# Patient Record
Sex: FEMALE | Race: BLACK OR AFRICAN AMERICAN | NOT HISPANIC OR LATINO | Employment: UNEMPLOYED | URBAN - METROPOLITAN AREA
[De-identification: names, ages, dates, MRNs, and addresses within clinical notes are randomized per-mention and may not be internally consistent; named-entity substitution may affect disease eponyms.]

---

## 2017-08-04 ENCOUNTER — HOSPITAL ENCOUNTER (INPATIENT)
Facility: HOSPITAL | Age: 42
LOS: 2 days | Discharge: LEFT BEFORE DISCHARGE | DRG: 189 | End: 2017-08-07
Attending: EMERGENCY MEDICINE | Admitting: HOSPITALIST
Payer: MEDICAID

## 2017-08-04 DIAGNOSIS — J81.1 PULMONARY EDEMA: ICD-10-CM

## 2017-08-04 DIAGNOSIS — R06.02 SHORTNESS OF BREATH: ICD-10-CM

## 2017-08-04 DIAGNOSIS — D72.829 LEUKOCYTOSIS, UNSPECIFIED TYPE: ICD-10-CM

## 2017-08-04 DIAGNOSIS — J81.0 ACUTE PULMONARY EDEMA: Primary | ICD-10-CM

## 2017-08-04 DIAGNOSIS — J96.01 ACUTE RESPIRATORY FAILURE WITH HYPOXIA: ICD-10-CM

## 2017-08-04 LAB
ALBUMIN SERPL BCP-MCNC: 3.2 G/DL
ALP SERPL-CCNC: 143 U/L
ALT SERPL W/O P-5'-P-CCNC: 6 U/L
ANION GAP SERPL CALC-SCNC: 15 MMOL/L
AST SERPL-CCNC: 45 U/L
B-HCG UR QL: NEGATIVE
BASOPHILS # BLD AUTO: 0.02 K/UL
BASOPHILS NFR BLD: 0.1 %
BILIRUB SERPL-MCNC: 0.5 MG/DL
BNP SERPL-MCNC: 383 PG/ML
BUN SERPL-MCNC: 10 MG/DL
CALCIUM SERPL-MCNC: 8.7 MG/DL
CHLORIDE SERPL-SCNC: 98 MMOL/L
CO2 SERPL-SCNC: 24 MMOL/L
CREAT SERPL-MCNC: 1.1 MG/DL
CTP QC/QA: YES
DIFFERENTIAL METHOD: ABNORMAL
EOSINOPHIL # BLD AUTO: 0 K/UL
EOSINOPHIL NFR BLD: 0.1 %
ERYTHROCYTE [DISTWIDTH] IN BLOOD BY AUTOMATED COUNT: 15.1 %
EST. GFR  (AFRICAN AMERICAN): >60 ML/MIN/1.73 M^2
EST. GFR  (NON AFRICAN AMERICAN): >60 ML/MIN/1.73 M^2
GLUCOSE SERPL-MCNC: 124 MG/DL
HCT VFR BLD AUTO: 41.6 %
HGB BLD-MCNC: 13.4 G/DL
LYMPHOCYTES # BLD AUTO: 0.7 K/UL
LYMPHOCYTES NFR BLD: 3.7 %
MCH RBC QN AUTO: 28 PG
MCHC RBC AUTO-ENTMCNC: 32.2 G/DL
MCV RBC AUTO: 87 FL
MONOCYTES # BLD AUTO: 0.4 K/UL
MONOCYTES NFR BLD: 2.4 %
NEUTROPHILS # BLD AUTO: 17.4 K/UL
NEUTROPHILS NFR BLD: 93.7 %
PLATELET # BLD AUTO: 214 K/UL
PMV BLD AUTO: 10.5 FL
POTASSIUM SERPL-SCNC: 3.3 MMOL/L
PROT SERPL-MCNC: 7.4 G/DL
RBC # BLD AUTO: 4.78 M/UL
SODIUM SERPL-SCNC: 137 MMOL/L
TROPONIN I SERPL DL<=0.01 NG/ML-MCNC: 0.05 NG/ML
TSH SERPL DL<=0.005 MIU/L-ACNC: 1.22 UIU/ML
WBC # BLD AUTO: 18.59 K/UL

## 2017-08-04 PROCEDURE — 63600175 PHARM REV CODE 636 W HCPCS: Performed by: EMERGENCY MEDICINE

## 2017-08-04 PROCEDURE — 96366 THER/PROPH/DIAG IV INF ADDON: CPT

## 2017-08-04 PROCEDURE — 99291 CRITICAL CARE FIRST HOUR: CPT | Mod: 25

## 2017-08-04 PROCEDURE — 27000190 HC CPAP FULL FACE MASK W/VALVE

## 2017-08-04 PROCEDURE — 84484 ASSAY OF TROPONIN QUANT: CPT

## 2017-08-04 PROCEDURE — 80053 COMPREHEN METABOLIC PANEL: CPT

## 2017-08-04 PROCEDURE — 20000000 HC ICU ROOM

## 2017-08-04 PROCEDURE — 96375 TX/PRO/DX INJ NEW DRUG ADDON: CPT

## 2017-08-04 PROCEDURE — 81025 URINE PREGNANCY TEST: CPT | Performed by: EMERGENCY MEDICINE

## 2017-08-04 PROCEDURE — 83880 ASSAY OF NATRIURETIC PEPTIDE: CPT

## 2017-08-04 PROCEDURE — 99900035 HC TECH TIME PER 15 MIN (STAT)

## 2017-08-04 PROCEDURE — 96365 THER/PROPH/DIAG IV INF INIT: CPT

## 2017-08-04 PROCEDURE — 86703 HIV-1/HIV-2 1 RESULT ANTBDY: CPT

## 2017-08-04 PROCEDURE — 94660 CPAP INITIATION&MGMT: CPT

## 2017-08-04 PROCEDURE — 84443 ASSAY THYROID STIM HORMONE: CPT

## 2017-08-04 PROCEDURE — 25500020 PHARM REV CODE 255: Performed by: EMERGENCY MEDICINE

## 2017-08-04 PROCEDURE — 25000003 PHARM REV CODE 250: Performed by: EMERGENCY MEDICINE

## 2017-08-04 PROCEDURE — 93005 ELECTROCARDIOGRAM TRACING: CPT

## 2017-08-04 PROCEDURE — 96368 THER/DIAG CONCURRENT INF: CPT

## 2017-08-04 PROCEDURE — 85025 COMPLETE CBC W/AUTO DIFF WBC: CPT

## 2017-08-04 RX ORDER — ALBUTEROL SULFATE 0.63 MG/3ML
0.63 SOLUTION RESPIRATORY (INHALATION) EVERY 6 HOURS PRN
COMMUNITY
End: 2019-10-11 | Stop reason: SDUPTHER

## 2017-08-04 RX ORDER — FUROSEMIDE 10 MG/ML
20 INJECTION INTRAMUSCULAR; INTRAVENOUS
Status: COMPLETED | OUTPATIENT
Start: 2017-08-04 | End: 2017-08-04

## 2017-08-04 RX ORDER — OXYCODONE HYDROCHLORIDE 15 MG/1
10 TABLET ORAL EVERY 4 HOURS PRN
COMMUNITY

## 2017-08-04 RX ADMIN — AZITHROMYCIN MONOHYDRATE 500 MG: 500 INJECTION, POWDER, LYOPHILIZED, FOR SOLUTION INTRAVENOUS at 11:08

## 2017-08-04 RX ADMIN — IOHEXOL 70 ML: 350 INJECTION, SOLUTION INTRAVENOUS at 10:08

## 2017-08-04 RX ADMIN — CEFTRIAXONE 1 G: 1 INJECTION, SOLUTION INTRAVENOUS at 10:08

## 2017-08-04 RX ADMIN — FUROSEMIDE 20 MG: 10 INJECTION, SOLUTION INTRAMUSCULAR; INTRAVENOUS at 10:08

## 2017-08-05 PROBLEM — E87.6 HYPOKALEMIA: Status: ACTIVE | Noted: 2017-08-05

## 2017-08-05 PROBLEM — R79.89 ELEVATED TROPONIN: Status: ACTIVE | Noted: 2017-08-05

## 2017-08-05 PROBLEM — J96.01 ACUTE HYPOXEMIC RESPIRATORY FAILURE: Status: ACTIVE | Noted: 2017-08-05

## 2017-08-05 PROBLEM — J81.0 ACUTE PULMONARY EDEMA: Status: ACTIVE | Noted: 2017-08-05

## 2017-08-05 PROBLEM — D72.9 NEUTROPHILIC LEUKOCYTOSIS: Status: ACTIVE | Noted: 2017-08-05

## 2017-08-05 PROBLEM — D72.828 NEUTROPHILIC LEUKOCYTOSIS: Status: ACTIVE | Noted: 2017-08-05

## 2017-08-05 LAB
ALLENS TEST: ABNORMAL
ANION GAP SERPL CALC-SCNC: 11 MMOL/L
AORTIC VALVE REGURGITATION: NORMAL
BASOPHILS # BLD AUTO: 0.01 K/UL
BASOPHILS NFR BLD: 0.1 %
BUN SERPL-MCNC: 10 MG/DL
CALCIUM SERPL-MCNC: 9.3 MG/DL
CHLORIDE SERPL-SCNC: 100 MMOL/L
CO2 SERPL-SCNC: 24 MMOL/L
CREAT SERPL-MCNC: 1.1 MG/DL
CRP SERPL-MCNC: 297.58 MG/L
DELSYS: ABNORMAL
DIFFERENTIAL METHOD: ABNORMAL
EOSINOPHIL # BLD AUTO: 0 K/UL
EOSINOPHIL NFR BLD: 0 %
EP: 5
ERYTHROCYTE [DISTWIDTH] IN BLOOD BY AUTOMATED COUNT: 15.1 %
ERYTHROCYTE [SEDIMENTATION RATE] IN BLOOD BY WESTERGREN METHOD: 12 MM/H
ERYTHROCYTE [SEDIMENTATION RATE] IN BLOOD BY WESTERGREN METHOD: 41 MM/HR
EST. GFR  (AFRICAN AMERICAN): >60 ML/MIN/1.73 M^2
EST. GFR  (NON AFRICAN AMERICAN): >60 ML/MIN/1.73 M^2
ESTIMATED PA SYSTOLIC PRESSURE: 34.83
FIO2: 70
GLOBAL PERICARDIAL EFFUSION: NORMAL
GLUCOSE SERPL-MCNC: 128 MG/DL
HCO3 UR-SCNC: 28.2 MMOL/L (ref 24–28)
HCT VFR BLD AUTO: 42 %
HGB BLD-MCNC: 13.7 G/DL
HIV1+2 IGG SERPL QL IA.RAPID: NEGATIVE
IP: 10
LYMPHOCYTES # BLD AUTO: 0.8 K/UL
LYMPHOCYTES NFR BLD: 5 %
MCH RBC QN AUTO: 28.4 PG
MCHC RBC AUTO-ENTMCNC: 32.6 G/DL
MCV RBC AUTO: 87 FL
MIN VOL: 14.1
MITRAL VALVE MOBILITY: NORMAL
MODE: ABNORMAL
MONOCYTES # BLD AUTO: 0.3 K/UL
MONOCYTES NFR BLD: 1.9 %
NEUTROPHILS # BLD AUTO: 14.4 K/UL
NEUTROPHILS NFR BLD: 93 %
PCO2 BLDA: 42.3 MMHG (ref 35–45)
PH SMN: 7.43 [PH] (ref 7.35–7.45)
PLATELET # BLD AUTO: 220 K/UL
PMV BLD AUTO: 10.7 FL
PO2 BLDA: 117 MMHG (ref 80–100)
POC BE: 3 MMOL/L
POC SATURATED O2: 99 % (ref 95–100)
POC TCO2: 29 MMOL/L (ref 23–27)
POTASSIUM SERPL-SCNC: 4.6 MMOL/L
RBC # BLD AUTO: 4.83 M/UL
RETIRED EF AND QEF - SEE NOTES: 55 (ref 55–65)
SAMPLE: ABNORMAL
SITE: ABNORMAL
SODIUM SERPL-SCNC: 135 MMOL/L
SP02: 100
SPONT RATE: 29
WBC # BLD AUTO: 15.48 K/UL

## 2017-08-05 PROCEDURE — 63600175 PHARM REV CODE 636 W HCPCS: Performed by: INTERNAL MEDICINE

## 2017-08-05 PROCEDURE — 80048 BASIC METABOLIC PNL TOTAL CA: CPT

## 2017-08-05 PROCEDURE — 86141 C-REACTIVE PROTEIN HS: CPT

## 2017-08-05 PROCEDURE — 94640 AIRWAY INHALATION TREATMENT: CPT

## 2017-08-05 PROCEDURE — 99900035 HC TECH TIME PER 15 MIN (STAT)

## 2017-08-05 PROCEDURE — 80307 DRUG TEST PRSMV CHEM ANLYZR: CPT

## 2017-08-05 PROCEDURE — 20000000 HC ICU ROOM

## 2017-08-05 PROCEDURE — 36415 COLL VENOUS BLD VENIPUNCTURE: CPT

## 2017-08-05 PROCEDURE — 63600175 PHARM REV CODE 636 W HCPCS: Performed by: EMERGENCY MEDICINE

## 2017-08-05 PROCEDURE — 25000003 PHARM REV CODE 250: Performed by: HOSPITALIST

## 2017-08-05 PROCEDURE — 25000242 PHARM REV CODE 250 ALT 637 W/ HCPCS: Performed by: HOSPITALIST

## 2017-08-05 PROCEDURE — 85025 COMPLETE CBC W/AUTO DIFF WBC: CPT

## 2017-08-05 PROCEDURE — 25000003 PHARM REV CODE 250: Performed by: EMERGENCY MEDICINE

## 2017-08-05 PROCEDURE — 94660 CPAP INITIATION&MGMT: CPT

## 2017-08-05 PROCEDURE — 85651 RBC SED RATE NONAUTOMATED: CPT

## 2017-08-05 PROCEDURE — 25000003 PHARM REV CODE 250: Performed by: INTERNAL MEDICINE

## 2017-08-05 PROCEDURE — 86738 MYCOPLASMA ANTIBODY: CPT | Mod: 91

## 2017-08-05 PROCEDURE — A4216 STERILE WATER/SALINE, 10 ML: HCPCS | Performed by: EMERGENCY MEDICINE

## 2017-08-05 PROCEDURE — 86713 LEGIONELLA ANTIBODY: CPT

## 2017-08-05 PROCEDURE — 27000221 HC OXYGEN, UP TO 24 HOURS

## 2017-08-05 PROCEDURE — 36600 WITHDRAWAL OF ARTERIAL BLOOD: CPT

## 2017-08-05 PROCEDURE — 93306 TTE W/DOPPLER COMPLETE: CPT

## 2017-08-05 PROCEDURE — 82803 BLOOD GASES ANY COMBINATION: CPT

## 2017-08-05 PROCEDURE — 93306 TTE W/DOPPLER COMPLETE: CPT | Mod: 26,,, | Performed by: INTERNAL MEDICINE

## 2017-08-05 PROCEDURE — 87449 NOS EACH ORGANISM AG IA: CPT

## 2017-08-05 RX ORDER — POTASSIUM CHLORIDE 20 MEQ/1
40 TABLET, EXTENDED RELEASE ORAL ONCE
Status: DISCONTINUED | OUTPATIENT
Start: 2017-08-05 | End: 2017-08-07 | Stop reason: HOSPADM

## 2017-08-05 RX ORDER — ACETAMINOPHEN 325 MG/1
650 TABLET ORAL EVERY 8 HOURS PRN
Status: DISCONTINUED | OUTPATIENT
Start: 2017-08-05 | End: 2017-08-07 | Stop reason: HOSPADM

## 2017-08-05 RX ORDER — FUROSEMIDE 10 MG/ML
20 INJECTION INTRAMUSCULAR; INTRAVENOUS 2 TIMES DAILY
Status: DISCONTINUED | OUTPATIENT
Start: 2017-08-05 | End: 2017-08-05

## 2017-08-05 RX ORDER — GUAIFENESIN/DEXTROMETHORPHAN 100-10MG/5
10 SYRUP ORAL EVERY 4 HOURS PRN
Status: DISCONTINUED | OUTPATIENT
Start: 2017-08-05 | End: 2017-08-07 | Stop reason: HOSPADM

## 2017-08-05 RX ORDER — GABAPENTIN 100 MG/1
100 CAPSULE ORAL 3 TIMES DAILY
COMMUNITY
End: 2022-08-01

## 2017-08-05 RX ORDER — ALPRAZOLAM 0.25 MG/1
0.25 TABLET ORAL 3 TIMES DAILY
COMMUNITY
End: 2019-01-30

## 2017-08-05 RX ORDER — FUROSEMIDE 10 MG/ML
40 INJECTION INTRAMUSCULAR; INTRAVENOUS ONCE
Status: COMPLETED | OUTPATIENT
Start: 2017-08-05 | End: 2017-08-05

## 2017-08-05 RX ORDER — OXYCODONE HYDROCHLORIDE 5 MG/1
5 TABLET ORAL EVERY 6 HOURS PRN
Status: DISCONTINUED | OUTPATIENT
Start: 2017-08-05 | End: 2017-08-07 | Stop reason: HOSPADM

## 2017-08-05 RX ORDER — ONDANSETRON 2 MG/ML
8 INJECTION INTRAMUSCULAR; INTRAVENOUS EVERY 8 HOURS PRN
Status: DISCONTINUED | OUTPATIENT
Start: 2017-08-05 | End: 2017-08-07 | Stop reason: HOSPADM

## 2017-08-05 RX ORDER — LORAZEPAM 2 MG/ML
0.5 INJECTION INTRAMUSCULAR
Status: DISCONTINUED | OUTPATIENT
Start: 2017-08-05 | End: 2017-08-05

## 2017-08-05 RX ORDER — LEVOTHYROXINE SODIUM 150 UG/1
150 TABLET ORAL DAILY
Status: DISCONTINUED | OUTPATIENT
Start: 2017-08-05 | End: 2017-08-07 | Stop reason: HOSPADM

## 2017-08-05 RX ORDER — SODIUM CHLORIDE 0.9 % (FLUSH) 0.9 %
3 SYRINGE (ML) INJECTION EVERY 8 HOURS
Status: DISCONTINUED | OUTPATIENT
Start: 2017-08-05 | End: 2017-08-07 | Stop reason: HOSPADM

## 2017-08-05 RX ORDER — IPRATROPIUM BROMIDE AND ALBUTEROL SULFATE 2.5; .5 MG/3ML; MG/3ML
3 SOLUTION RESPIRATORY (INHALATION) EVERY 6 HOURS
Status: DISCONTINUED | OUTPATIENT
Start: 2017-08-05 | End: 2017-08-07 | Stop reason: HOSPADM

## 2017-08-05 RX ORDER — ONDANSETRON 2 MG/ML
4 INJECTION INTRAMUSCULAR; INTRAVENOUS EVERY 12 HOURS PRN
Status: DISCONTINUED | OUTPATIENT
Start: 2017-08-05 | End: 2017-08-05

## 2017-08-05 RX ORDER — FUROSEMIDE 10 MG/ML
60 INJECTION INTRAMUSCULAR; INTRAVENOUS 2 TIMES DAILY
Status: DISCONTINUED | OUTPATIENT
Start: 2017-08-05 | End: 2017-08-07 | Stop reason: HOSPADM

## 2017-08-05 RX ORDER — ENOXAPARIN SODIUM 100 MG/ML
40 INJECTION SUBCUTANEOUS EVERY 24 HOURS
Status: DISCONTINUED | OUTPATIENT
Start: 2017-08-05 | End: 2017-08-07 | Stop reason: HOSPADM

## 2017-08-05 RX ADMIN — FUROSEMIDE 20 MG: 10 INJECTION, SOLUTION INTRAMUSCULAR; INTRAVENOUS at 09:08

## 2017-08-05 RX ADMIN — OXYCODONE HYDROCHLORIDE 5 MG: 5 TABLET ORAL at 02:08

## 2017-08-05 RX ADMIN — GUAIFENESIN AND DEXTROMETHORPHAN 10 ML: 100; 10 SYRUP ORAL at 02:08

## 2017-08-05 RX ADMIN — OXYCODONE HYDROCHLORIDE 5 MG: 5 TABLET ORAL at 08:08

## 2017-08-05 RX ADMIN — IPRATROPIUM BROMIDE AND ALBUTEROL SULFATE 3 ML: .5; 3 SOLUTION RESPIRATORY (INHALATION) at 07:08

## 2017-08-05 RX ADMIN — FUROSEMIDE 60 MG: 10 INJECTION, SOLUTION INTRAMUSCULAR; INTRAVENOUS at 05:08

## 2017-08-05 RX ADMIN — IPRATROPIUM BROMIDE AND ALBUTEROL SULFATE 3 ML: .5; 3 SOLUTION RESPIRATORY (INHALATION) at 12:08

## 2017-08-05 RX ADMIN — Medication 3 ML: at 10:08

## 2017-08-05 RX ADMIN — ENOXAPARIN SODIUM 40 MG: 100 INJECTION SUBCUTANEOUS at 05:08

## 2017-08-05 RX ADMIN — AZITHROMYCIN MONOHYDRATE 500 MG: 500 INJECTION, POWDER, LYOPHILIZED, FOR SOLUTION INTRAVENOUS at 10:08

## 2017-08-05 RX ADMIN — GUAIFENESIN AND DEXTROMETHORPHAN 10 ML: 100; 10 SYRUP ORAL at 10:08

## 2017-08-05 RX ADMIN — CEFTRIAXONE 1 G: 1 INJECTION, SOLUTION INTRAVENOUS at 10:08

## 2017-08-05 RX ADMIN — FUROSEMIDE 40 MG: 10 INJECTION, SOLUTION INTRAMUSCULAR; INTRAVENOUS at 12:08

## 2017-08-05 RX ADMIN — LEVOTHYROXINE SODIUM 150 MCG: 150 TABLET ORAL at 06:08

## 2017-08-05 RX ADMIN — ACETAMINOPHEN 650 MG: 325 TABLET, FILM COATED ORAL at 10:08

## 2017-08-05 RX ADMIN — Medication 3 ML: at 06:08

## 2017-08-05 RX ADMIN — GUAIFENESIN AND DEXTROMETHORPHAN 10 ML: 100; 10 SYRUP ORAL at 06:08

## 2017-08-05 NOTE — ED PROVIDER NOTES
"Encounter Date: 8/4/2017    SCRIBE #1 NOTE: I, Piedad Regalado, am scribing for, and in the presence of, Pelon Zuleta III, MD. Other sections scribed: HPI, ROS.       History     Chief Complaint   Patient presents with    Shortness of Breath     Pt reports shortness of breath that began earlier today. Pt has been treated for bronchitis in the past. Per EMS, 60 % sats on RA prior to arrival. Pt presents with 93% on breathing treatment     CC: Shortness of Breath    HPI: This 42 y.o. female  with a past medical history of Depression; Discoid lupus; and Hypothyroid presents to the ED via EMS c/o acute onset of SOB with associated dry cough, sore throat, and subjective fevers beginning this AM . Pt reports the SOB and associated sx "came out of nowhere" and has not gone away. Pt's pain is severe (8/10). Pt tried an albuterol treatment with no resolve of sx. Per EMS, 60% O2 saturation prior to arrival to ED .                Review of patient's allergies indicates:  No Known Allergies  Past Medical History:   Diagnosis Date    Depression     Discoid lupus     Hypothyroid      Past Surgical History:   Procedure Laterality Date    THYROIDECTOMY      TUBAL LIGATION       No family history on file.  Social History   Substance Use Topics    Smoking status: Current Every Day Smoker     Packs/day: 0.50     Years: 0.50     Types: Cigarettes    Smokeless tobacco: Not on file    Alcohol use No     Review of Systems   Constitutional: Positive for fever (subjective).   HENT: Positive for sore throat.    Respiratory: Positive for cough (dry) and shortness of breath.    Cardiovascular: Negative for chest pain.   Gastrointestinal: Positive for nausea.   Genitourinary: Negative for dysuria.   Musculoskeletal: Negative for back pain.   Skin: Negative for rash.   Neurological: Negative for weakness.   Hematological: Does not bruise/bleed easily.       Physical Exam     Initial Vitals [08/04/17 1943]   BP Pulse Resp Temp SpO2   (!) " 146/72 (!) 113 (!) 22 98.2 °F (36.8 °C) (!) 93 %      MAP       96.67         Physical Exam    Nursing note and vitals reviewed.  Constitutional: She appears well-developed and well-nourished. She is not diaphoretic. No distress.   HENT:   Head: Normocephalic and atraumatic.   Nose: Nose normal.   Mouth/Throat: Oropharynx is clear and moist. No oropharyngeal exudate.   Eyes: Conjunctivae and EOM are normal. Pupils are equal, round, and reactive to light. No scleral icterus.   Neck: Normal range of motion. Neck supple. No thyromegaly present. No tracheal deviation present.   Cardiovascular: Regular rhythm and normal heart sounds. Exam reveals no gallop and no friction rub.    No murmur heard.  Mildly tachycardic   Pulmonary/Chest: No respiratory distress. She has no wheezes. She has no rhonchi. She has rales.   Mildly increased work of breathing   Abdominal: Soft. Bowel sounds are normal. She exhibits no distension and no mass. There is no tenderness. There is no rebound and no guarding.   Musculoskeletal: Normal range of motion. She exhibits no edema or tenderness.   Lymphadenopathy:     She has no cervical adenopathy.   Neurological: She is alert and oriented to person, place, and time. She has normal strength. No cranial nerve deficit or sensory deficit.   Mildly somnolent, easily arousable   Skin: Skin is warm and dry. No rash noted. No erythema. No pallor.   Psychiatric: She has a normal mood and affect. Her behavior is normal. Thought content normal.         ED Course   Critical Care  Date/Time: 8/5/2017 1:13 AM  Performed by: FOSTER ADRIAN III  Authorized by: FOSTER ADRIAN III   Direct patient critical care time: 30 minutes  Additional history critical care time: 2 minutes  Ordering / reviewing critical care time: 8 minutes  Documentation critical care time: 10 minutes  Consulting other physicians critical care time: 5 minutes  Total critical care time (exclusive of procedural time) : 55 minutes  Critical  care time was exclusive of teaching time and separately billable procedures and treating other patients.  Critical care was necessary to treat or prevent imminent or life-threatening deterioration of the following conditions: respiratory failure and cardiac failure.        Labs Reviewed   CBC W/ AUTO DIFFERENTIAL - Abnormal; Notable for the following:        Result Value    WBC 18.59 (*)     RDW 15.1 (*)     Gran # 17.4 (*)     Lymph # 0.7 (*)     Gran% 93.7 (*)     Lymph% 3.7 (*)     Mono% 2.4 (*)     All other components within normal limits   B-TYPE NATRIURETIC PEPTIDE - Abnormal; Notable for the following:      (*)     All other components within normal limits   COMPREHENSIVE METABOLIC PANEL - Abnormal; Notable for the following:     Potassium 3.3 (*)     Glucose 124 (*)     Albumin 3.2 (*)     Alkaline Phosphatase 143 (*)     AST 45 (*)     ALT 6 (*)     All other components within normal limits   TROPONIN I - Abnormal; Notable for the following:     Troponin I 0.048 (*)     All other components within normal limits   TSH   RAPID HIV   POCT URINE PREGNANCY             Medical Decision Making:   Initial Assessment:   42-year-old female with a history of discoid lupus, not taking any SLE meds, and no other significant history presents complaining of shortness of breath and cough that began today.  EMS reports patient's oxygen saturation was 60% on room air when they arrived at her home.  Patient reported she had tried nebulized albuterol at home with no effect.  On exam patient is lying back in bed with head of the bed elevated to 45°, with only mildly increased work of breathing and no respiratory distress.  She does have central rales, but no other evidence of volume overload.  Her blood pressure is normal.  Her oxygen saturation is somewhat difficult to obtain, jumping rapidly from the low 80s to the low 90s, even with a good waveform.   Differential Diagnosis:   Pneumonia, CHF, noncardiogenic pulmonary  edema, pulmonary embolism  Independently Interpreted Test(s):   I have ordered and independently interpreted X-rays - see summary below.       <> Summary of X-Ray Reading(s): Chest x-ray: Suggestive of pulmonary edema, no cardiomegaly  I have ordered and independently interpreted EKG Reading(s) - see summary below       <> Summary of EKG Reading(s): Sinus rhythm at 100 bpm, normal axis, prolonged QT with otherwise normal intervals, left atrial enlargement and LVH  ED Management:  Patient's workup reveals an equivocal BNP, leukocytosis with no bandemia, no fever, no renal abnormality, chest x-ray suggestive of pulmonary edema.  Will treat empirically with ceftriaxone and azithromycin along with IV Lasix.    Underlying etiology of pulmonary edema not immediately clear.  Presumptive diagnosis is CHF, but patient has no peripheral edema, her blood pressure is upper limits of normal, her BNP is equivocal, cardiomediastinal silhouette normal on x-ray.  Will obtain CT of the chest to rule out pulmonary embolism.     CT chest does not show PE.  Diffuse groundglass opacity most suggestive of pulmonary edema versus interstitial pneumonia.     Oxygen saturation has remained somewhat difficult to reliably obtain in the emergency department, still rapidly changing despite a good waveform, but the patient has been noted to have sats reading more consistently in the 80s.  As result ABG was ordered, which shows a PO2 in the 50s.  Despite this, the patient's work of breathing remains only slightly increased, and she continues to lie back in bed at an angle of 30-45° without respiratory distress.  BiPAP has been initiated for respiratory support, with improvement of her sats reliably into the low to mid 90s.  She remains somnolent but arousable, answering questions well and and then falling back to sleep.     At this time I will place admission orders to the ICU.  I have discussed this case with Dr. Beltre.             No  Attestation:   Scribe #1: I performed the above scribed service and the documentation accurately describes the services I performed. I attest to the accuracy of the note.    Attending Attestation:           Physician Attestation for Scribe:  Physician Attestation Statement for Scribe #1: I, Pelon Zuleta III, MD, reviewed documentation, as scribed by Piedad Regalado in my presence, and it is both accurate and complete.                 ED Course     Clinical Impression:   The primary encounter diagnosis was Acute pulmonary edema. Diagnoses of Shortness of breath, Leukocytosis, unspecified type, and Acute respiratory failure with hypoxia were also pertinent to this visit.                           Pelon Zuleta III, MD  08/05/17 0115

## 2017-08-05 NOTE — ASSESSMENT & PLAN NOTE
· Evidenced by history, physical exam, ABG with hypoxia, and imaging  · Trial of diuresis  · Continue supplemental oxygen and BiPAP  · Evaluation as above

## 2017-08-05 NOTE — PROGRESS NOTES
ABG was reported to Dr. Zuleta   7.32/46.3/58/-3/23.8/87%  Dr. Zuleta said place patient on Bipap. Patient is now on Bipap 10/5 70%. Alarms set and functioning. Will continue to monitor.

## 2017-08-05 NOTE — ASSESSMENT & PLAN NOTE
· Due to GI losses or poor oral intake  · Check magnesium  · Replace potassium orally if possible, if not then IV  · Monitor with serial labs

## 2017-08-05 NOTE — ASSESSMENT & PLAN NOTE
· Differential is broad but highly suspicious for infectious etiology given her history, neurotrophilic leukocytosis, and CXR and CT findings.  · Differential includes:  interstitial/atypical PNA (chlamydia, mycoplasma, PCP, CMV, HIV) vs ARDS/noncardiogenic pulmonary edema vs SLE pneumonitis vs acute heart failure 2/2 hypthyroidism vs Lupus induced heart failure (restrictive cardiomyopathy - coronary vs myocardial vs pericardial) vs PE  ·  WBC 18.39, 93.7% granulocytes strongly suggestive of bacterial infection  ·  BNP - 383, equivocal for heart failure  ·  CXR, CTA - No PE. Findings are nonspecific and may represent edema, ARDS, or multifocal infection.   ·  Continue BiPAP  ·  Started empiric broad spectrum abx with ceftriaxone and azithromycin.  ·  Lasix for pulmonary edema   ·  Pulmonology consult for recommendations and respiratory sampling  ·  2D Echo to evaluate for SLE flair and possibly future biopsy

## 2017-08-05 NOTE — PROGRESS NOTES
Patient on 50% venti mask. Sat 75%.Placed back on BiPAP 12,10/5,.50.  Informed Dr Baird. Will try to wean patient to venti-mask on night shift & get ABG while on venti mask then.

## 2017-08-05 NOTE — MEDICAL/APP STUDENT
MEDICAL STUDENT NOTE    [facility] Hospital Medicine  History & Physical    Patient Name: Beth Cassidy  MRN: [id number]  Admission Date: 08/05/2017  Attending Physician: Robin Beltre MD, MPH      PCP:     Mati Fabian MD    CC:     Chief Complaint   Patient presents with    Shortness of Breath     Pt reports shortness of breath that began earlier today. Pt has been treated for bronchitis in the past. Per EMS, 60 % sats on RA prior to arrival. Pt presents with 93% on breathing treatment       HISTORY OF PRESENT ILLNESS:     Beth Cassiyd is a 42 y.o. female that (in part)  has a past medical history of Anxiety; Depression; Discoid lupus; Hypothyroid; Neuropathy due to systemic lupus erythematosus; and Systemic lupus erythematosus arthritis. Presents to [facility] Emergency Department c/o acute onset SOB. Pt reports nausea and sore throat onset yesterday. Upon waking this morning she had acute onset of dry cough and SOB with subjective fever and diaphoresis. Sx have remained constant since onset. SOB did not improve after albuterol treatment at home. Pt also took dose of amoxicillin at home for sore throat. No relieving factors.     Pt reports sick contact with a friend who had been vomiting recently. Pt states she has been off of steroids for 2 weeks for treatment of Lupus, as she normally alternates 2 weeks on/off. Denies any CP or leg pain/swelling. Pt has hx of neuropathy and arthritis 2/2 lupus, but has no acute changes. Pt states she has been compliant with levothyroxine.     Per EMS, 60% O2 saturation prior to arrival to ED. In the emergency department, she was w/u for acute SOB. CXR and CT chest ordered. Started on BiPAP for hypoxia and empiric treatment with ceftriaxone and azithromycin with IV Lasix.    Hospital medicine has been asked to admit to ICU for further evaluation and treatment.       REVIEW OF SYSTEMS:     Review of Systems    Constitutional: Positive for diaphoresis and fever (subjective). Negative for malaise/fatigue and weight loss.   HENT: Positive for sore throat. Negative for congestion.    Eyes: Negative for blurred vision, double vision and photophobia.   Respiratory: Positive for cough (dry). Negative for sputum production and shortness of breath.    Cardiovascular: Negative for chest pain, palpitations and leg swelling.   Gastrointestinal: Positive for nausea. Negative for abdominal pain, diarrhea, heartburn and vomiting.   Genitourinary: Negative for dysuria, frequency and urgency.   Musculoskeletal: Negative for back pain, falls and myalgias.   Skin: Negative for rash.   Neurological: Negative for dizziness, sensory change, focal weakness and headaches.   Endo/Heme/Allergies: Does not bruise/bleed easily.   Psychiatric/Behavioral: Negative for depression and substance abuse. The patient is not nervous/anxious.        PAST MEDICAL / SURGICAL HISTORY:     Past Medical History:   Diagnosis Date    Anxiety     Depression     Discoid lupus     Hypothyroid     Neuropathy due to systemic lupus erythematosus     Systemic lupus erythematosus arthritis      Past Surgical History:   Procedure Laterality Date    THYROIDECTOMY      TUBAL LIGATION           FAMILY HISTORY:     Family History   Problem Relation Age of Onset    No Known Problems Mother     No Known Problems Father          SOCIAL HISTORY:     Social History   Substance Use Topics    Smoking status: Current Every Day Smoker     Packs/day: 0.50     Years: 0.50     Types: Cigarettes    Smokeless tobacco: Not on file    Alcohol use No         ALLERGIES:       Review of patient's allergies indicates:   Allergen Reactions    Morphine          HEALTH SCREENING:     Prevnar 13 pneumonia vaccine = no evidence of previous vaccination found in the medical record  No influenza vaccine      HOME MEDICATIONS:     Prior to Admission medications    Medication Sig Start Date  End Date Taking? Authorizing Provider   albuterol (ACCUNEB) 0.63 mg/3 mL Nebu Take 0.63 mg by nebulization every 6 (six) hours as needed. Rescue   Yes Historical Provider, MD   levothyroxine (SYNTHROID) 150 MCG tablet Take 150 mcg by mouth once daily.     Yes Historical Provider, MD   oxycodone (ROXICODONE) 15 MG Tab Take 10 mg by mouth every 4 (four) hours as needed for Pain.   Yes Historical Provider, MD   mupirocin (BACTROBAN) 2 % ointment Apply topically 3 (three) times daily.      Historical Provider, MD   tizanidine (ZANAFLEX) 2 mg capsule Take 1 capsule (2 mg total) by mouth 3 (three) times daily. 9/10/12   Lopez Cassidy MD   trazodone (DESYREL) 100 MG tablet Take 100 mg by mouth every evening.      Historical Provider, MD          [facility] MEDICATIONS:     Scheduled Meds:  Continuous Infusions:   PRN Meds:       PHYSICAL EXAM:     Wt Readings from Last 1 Encounters:   08/05/17 0230 107.7 kg (237 lb 7 oz)   08/04/17 1943 99.3 kg (219 lb)     Body mass index is 39.51 kg/m².  Vitals:    08/05/17 0400 08/05/17 0402 08/05/17 0404 08/05/17 0405   BP:  119/68     Pulse:   81    Resp: (!) 44   (!) 47   Temp:       TempSrc:       SpO2:   97%    Weight:       Height:            Physical Exam   Constitutional: She is oriented to person, place, and time and well-developed, well-nourished, and in no distress. No distress.   HENT:   Head: Normocephalic and atraumatic.   Mouth/Throat: Oropharynx is clear and moist.   Eyes: Conjunctivae and EOM are normal. Pupils are equal, round, and reactive to light. No scleral icterus.   Neck: Normal range of motion. Neck supple.   Cardiovascular: Normal rate, regular rhythm, normal heart sounds and intact distal pulses.    Pulmonary/Chest: Tachypnea noted. She is in respiratory distress (on BiPAP). She has rales.   Abdominal: Soft. Bowel sounds are normal. She exhibits no distension and no mass. There is no tenderness.   Genitourinary:   Genitourinary Comments: Pelvic exam was  not performed   Musculoskeletal: Normal range of motion. She exhibits no tenderness.   Neurological: She is alert and oriented to person, place, and time. No cranial nerve deficit. Gait normal.   Skin: Skin is warm and dry. No rash noted.   Psychiatric: Mood and affect normal.         LABORATORY STUDIES:     Recent Results (from the past 24 hour(s))   CBC auto differential    Collection Time: 08/04/17  8:15 PM   Result Value Ref Range    WBC 18.59 (H) 3.90 - 12.70 K/uL    RBC 4.78 4.00 - 5.40 M/uL    Hemoglobin 13.4 12.0 - 16.0 g/dL    Hematocrit 41.6 37.0 - 48.5 %    MCV 87 82 - 98 fL    MCH 28.0 27.0 - 31.0 pg    MCHC 32.2 32.0 - 36.0 g/dL    RDW 15.1 (H) 11.5 - 14.5 %    Platelets 214 150 - 350 K/uL    MPV 10.5 9.2 - 12.9 fL    Gran # 17.4 (H) 1.8 - 7.7 K/uL    Lymph # 0.7 (L) 1.0 - 4.8 K/uL    Mono # 0.4 0.3 - 1.0 K/uL    Eos # 0.0 0.0 - 0.5 K/uL    Baso # 0.02 0.00 - 0.20 K/uL    Gran% 93.7 (H) 38.0 - 73.0 %    Lymph% 3.7 (L) 18.0 - 48.0 %    Mono% 2.4 (L) 4.0 - 15.0 %    Eosinophil% 0.1 0.0 - 8.0 %    Basophil% 0.1 0.0 - 1.9 %    Differential Method Automated    Brain natriuretic peptide    Collection Time: 08/04/17  8:15 PM   Result Value Ref Range     (H) 0 - 99 pg/mL   Comprehensive metabolic panel    Collection Time: 08/04/17  8:15 PM   Result Value Ref Range    Sodium 137 136 - 145 mmol/L    Potassium 3.3 (L) 3.5 - 5.1 mmol/L    Chloride 98 95 - 110 mmol/L    CO2 24 23 - 29 mmol/L    Glucose 124 (H) 70 - 110 mg/dL    BUN, Bld 10 6 - 20 mg/dL    Creatinine 1.1 0.5 - 1.4 mg/dL    Calcium 8.7 8.7 - 10.5 mg/dL    Total Protein 7.4 6.0 - 8.4 g/dL    Albumin 3.2 (L) 3.5 - 5.2 g/dL    Total Bilirubin 0.5 0.1 - 1.0 mg/dL    Alkaline Phosphatase 143 (H) 55 - 135 U/L    AST 45 (H) 10 - 40 U/L    ALT 6 (L) 10 - 44 U/L    Anion Gap 15 8 - 16 mmol/L    eGFR if African American >60 >60 mL/min/1.73 m^2    eGFR if non African American >60 >60 mL/min/1.73 m^2   Troponin I    Collection Time: 08/04/17  8:15 PM    Result Value Ref Range    Troponin I 0.048 (H) 0.000 - 0.026 ng/mL   TSH    Collection Time: 08/04/17  8:15 PM   Result Value Ref Range    TSH 1.215 0.400 - 4.000 uIU/mL   Rapid HIV    Collection Time: 08/04/17  8:15 PM   Result Value Ref Range    HIV Rapid Testing Negative Negative   POCT urine pregnancy    Collection Time: 08/04/17 10:35 PM   Result Value Ref Range    POC Preg Test, Ur Negative Negative     Acceptable Yes        MICROBIOLOGY DATA:     No results found for: LABGENI, LABREFE, LABUPPE, LABURIN, LABAFB    IMAGING:     Imaging Results          CTA Chest Non-Coronary (PE Study) (Final result)     Abnormal  Result time 08/04/17 23:30:50    Final result by Cathy Sigala MD (08/04/17 23:30:50)                 Impression:        1.No evidence of pulmonary thromboembolism.    2. Abnormal appearance of the lungs with extensive diffuse bilateral patchy groundglass and more consolidative opacities involving the entirety of the lung parenchyma. Findings are nonspecific and may represent edema, ARDS, or multifocal infection. Clinical correlation and continued followup is advised.    3. Status post thyroidectomy.    This report has been flagged in the [facility] medical record.      Electronically signed by: CATHY SIGALA  Date:     08/04/17  Time:    23:30              Narrative:    Technique: The chest was surveyed from the costophrenic angles through the lung apices at 3-mm increments after the administration of 70 cc of Omnipaque 350 intravenous contrast material according to the PE protocol.  Axial, sagittal and coronal maximum intensity projection images were reviewed.    Comparison:Chest radiograph 8/4/2017.    Findings:    The structures at the base of the neck demonstrate postsurgical changes of prior thyroidectomy.    The thoracic aorta maintains normal caliber, contour, and course without significant atherosclerotic calcification within its course.  There is no evidence of aneurysmal  dilation or dissection. The heart is not enlarged and there is no evidence of pericardial effusion.The esophagus maintains a normal course and caliber.There are scattered mildly prominent mediastinal lymph nodes. There is no significant axillary lymphadenopathy.      The trachea is midline and proximal airways are patent. The lungs demonstrate diffuse extensive patchy groundglass and more consolidative opacities involving the entirety of the lung parenchyma. There is no pneumothorax. There is no pleural effusion.     The pulmonary arteries distribute normally without evidence of filling defect to indicate pulmonary thromboembolism.     Limited images of the upper abdomen obtained during the course of this dedicated thoracic CT demonstrates no evidence of free intraperitoneal air or other acute abnormality.    The osseous structures are unremarkable.                             X-Ray Chest PA And Lateral (Final result)  Result time 08/04/17 21:37:12    Final result by Prashant Johnson MD (08/04/17 21:37:12)                 Impression:      Diffusely increased interstitial opacities along with prominent bilateral hilar attenuation and increased pulmonary vascular markings, suggestive of pulmonary edema.  ______________________________________     Electronically signed by resident: BOWEN BORDEN MD  Date:     08/04/17  Time:    21:21            As the supervising and teaching physician, I personally reviewed the images and resident's interpretation and I agree with the findings.          Electronically signed by: PRASHANT JOHNSON MD  Date:     08/04/17  Time:    21:37              Narrative:    CHEST XRAY, TWO VIEWS, PA AND LATERAL    INDICATION:  Shortness of breath.    COMPARISON:  No available priors.    FINDINGS:  Surgical clips noted in the lower neck. Cardiomediastinal silhouette is at the upper limits of normal in size. Trachea is midline. There are prominent bilateral hilar opacities. Pulmonary vascular markings is  increased. The lungs are equally well expanded. There are diffusely increased interstitial opacities with a bibasilar predominance, suggestive of edema. No pneumothorax or pleural effusion. Visualized osseous structures are unremarkable. Soft tissues are without significant abnormality. Visualized upper abdomen is unremarkable. No pneumoperitoneum.                                CONSULTS:     None       ASSESSMENT & PLAN:     Primary Diagnosis:  <principal problem not specified>    Active Hospital Problems    Diagnosis  POA    Acute pulmonary edema [J81.0]  Yes      Resolved Hospital Problems    Diagnosis Date Resolved POA   No resolved problems to display.     Hypoxia  · interstitial/atypical PNA (chlamydia, mycoplasma, PCP, CMV, HIV) vs ARDS/noncardiogenic pulmonary edema vs SLE pneumoniti acute heart failure 2/2 hypthyroidism vs Lupus induced heart failure (restrictive cardiomyopathy - coronary vs myocardial vs pericardial) vs s vs PE  · WBC 18.39, 93.7% granulocytes strongly suggestive of bacterial infection  · BNP - 383, equivocal for heart failure  · CXR, CTA - No PE. Findings are nonspecific and may represent edema, ARDS, or multifocal infection.   · Continue BiPAP  · Started empiric broad spectrum abx with ceftriaxone and azithromycin.  · Lasix for pulmonary edema   · Pulmonology consult for recommendations and respiratory sampling  · Echo if heart failure d/t SLE flair and possibly biopsy    Hypothyroidism  · TSH WNL  · Continue levothyroxine    SLE  · Consider prednisone (1 to 1.5 mg/kg per day in divided doses). If no response is seen within 72 hours or if the patient requires mechanical ventilation for respiratory failure, intravenous pulse glucocorticoids (ie, up to 1 gram of methylprednisolone per day for three days) are administered. In addition, immunosuppressive therapy (eg, cyclophosphamide, rituximab, intravenous immune globulin [IVIG]) may be initiated.    Depression  · Continue management for  depression and anxiety  · trazodone     Anxiety  · Xanax PRN    Smoking  ·  for smoking cessation             VTE Risk Mitigation         Ordered     enoxaparin injection 40 mg  Daily     Route:  Subcutaneous        08/05/17 0231     Medium Risk of VTE  Once      08/05/17 0231     Place sequential compression device  Until discontinued      08/05/17 0231     Place MISA hose  Until discontinued      08/05/17 0231            Adult PRN medications available   DVT prophylaxis given       DISPOSITION:     Will admit to the Hospital Medicine service for further evaluation and treatment.    Chart reviewed and updated where applicable.    High Risk Conditions:  Patient has a condition that poses threat to life and bodily function: Severe Respiratory Distress      ===============================================================    Piedad Palma, Medical Student  -[employer] [facility]      ===========================================                                                        STAFF PHYSICIAN NOTE                                   Attending Attestation for Rounds with Medical Student    I have reviewed and concur with the medical student's history, physical, assessment, and plan.  I have personally interviewed and examined the patient at bedside.  See separate H&P for my evaluation and additional findings.    Signing Physician:  Robin Beltre MD

## 2017-08-05 NOTE — CONSULTS
[facility]  Pulmonology  Consult Note    Patient Name: Beth Cassidy  MRN: [id number]  Admission Date: 8/4/2017  Hospital Length of Stay: 0 days  Code Status: Full Code  Attending Physician: Malina Baird MD  Primary Care Provider: Mati Fabian MD   Principal Problem: Acute hypoxemic respiratory failure    Consults  Subjective:     HPI: 42 year old with acute onset dyspnea while watching tv yesterday. Sats 60%. Brought in by ems and admitted for acute hypoxic respiratory failure. CTA with bilateral infiltrates and no PE. Pulmonary asked to see for evaluation. Patient with history of lupus, Anxiety, depression, hypothyroidism, and neuropathy. She is currently on bipap. Feels breathing is better. Dry cough. Mild intermittent wheezing. No fever or chills. She smokes one pack per day. Prior to, no dyspnea.     No current facility-administered medications on file prior to encounter.      Current Outpatient Prescriptions on File Prior to Encounter   Medication Sig Dispense Refill    levothyroxine (SYNTHROID) 150 MCG tablet Take 150 mcg by mouth once daily.        mupirocin (BACTROBAN) 2 % ointment Apply topically 3 (three) times daily.        tizanidine (ZANAFLEX) 2 mg capsule Take 1 capsule (2 mg total) by mouth 3 (three) times daily. 90 capsule 2    trazodone (DESYREL) 100 MG tablet Take 100 mg by mouth every evening.         Hospital meds:   albuterol-ipratropium 2.5mg-0.5mg/3mL  3 mL Nebulization Q6H    azithromycin  500 mg Intravenous Q24H    cefTRIAXone (ROCEPHIN) IVPB  1 g Intravenous Q24H    enoxaparin  40 mg Subcutaneous Daily    furosemide  20 mg Intravenous BID    levothyroxine  150 mcg Oral Daily    potassium chloride SA  40 mEq Oral Once    sodium chloride 0.9%  3 mL Intravenous Q8H       Past Medical History:   Diagnosis Date    Anxiety     Depression     Discoid lupus     Hypothyroid     Neuropathy due to systemic lupus erythematosus     Systemic lupus  erythematosus arthritis        Past Surgical History:   Procedure Laterality Date    THYROIDECTOMY      TUBAL LIGATION         Review of patient's allergies indicates:   Allergen Reactions    Morphine        Family History     Problem Relation (Age of Onset)    No Known Problems Mother, Father        Social History Main Topics    Smoking status: Current Every Day Smoker     Packs/day: 0.50     Years: 0.50     Types: Cigarettes    Smokeless tobacco: Not on file    Alcohol use No    Drug use: No    Sexual activity: Not on file      Review of Systems Difficult to obtain due to bipap, mildly sleepy. She is asking for pain meds however.    Objective:     Vital Signs (Most Recent):  Temp: 96.8 °F (36 °C) (08/05/17 0715)  Pulse: 76 (08/05/17 1000)  Resp: (!) 28 (08/05/17 1000)  BP: 122/82 (08/05/17 1000)  SpO2: 98 % (08/05/17 1000) Vital Signs (24h Range):  Temp:  [96.8 °F (36 °C)-98.2 °F (36.8 °C)] 96.8 °F (36 °C)  Pulse:  [] 76  Resp:  [21-52] 28  SpO2:  [93 %-100 %] 98 %  BP: (111-150)/(58-91) 122/82     Weight: 107.7 kg (237 lb 7 oz)  Body mass index is 39.51 kg/m².      Intake/Output Summary (Last 24 hours) at 08/05/17 1039  Last data filed at 08/05/17 1000   Gross per 24 hour   Intake               60 ml   Output             1590 ml   Net            -1530 ml       Physical Exam   Constitutional: She is oriented to person, place, and time. She appears well-developed and well-nourished. No distress.   HENT:   Head: Normocephalic and atraumatic.   Neck: Neck supple.   Cardiovascular: Normal rate.    Pulmonary/Chest: Effort normal. No respiratory distress. She has no wheezes.   Shallow decreased bs bilateral.   Abdominal: Soft. Bowel sounds are normal. There is no tenderness.   Musculoskeletal: She exhibits no edema or deformity.   Lymphadenopathy:     She has no cervical adenopathy.   Neurological: She is oriented to person, place, and time.   Skin: Skin is warm and dry.   Psychiatric: She has a normal  mood and affect. Her behavior is normal.     Vents:  Oxygen Concentration (%): 70 (08/05/17 1000)    Lines/Drains/Airways     Peripheral Intravenous Line                 Peripheral IV - Single Lumen 08/04/17 2231 Left Antecubital less than 1 day         Peripheral IV - Single Lumen 08/05/17 0209 Right Forearm less than 1 day                Significant Labs:    CBC/Anemia Profile:    Recent Labs  Lab 08/04/17 2015 08/05/17  0557   WBC 18.59* 15.48*   HGB 13.4 13.7   HCT 41.6 42.0    220   MCV 87 87   RDW 15.1* 15.1*        Chemistries:    Recent Labs  Lab 08/04/17 2015 08/05/17  0556    135*   K 3.3* 4.6   CL 98 100   CO2 24 24   BUN 10 10   CREATININE 1.1 1.1   CALCIUM 8.7 9.3   ALBUMIN 3.2*  --    PROT 7.4  --    BILITOT 0.5  --    ALKPHOS 143*  --    ALT 6*  --    AST 45*  --    Rapid HIV negative  TSH 1.215  UPT negative      Significant Imaging:   CT: I have reviewed all pertinent results/findings within the past 24 hours and my personal findings are:    1. No evidence of pulmonary thromboembolism.  2. Abnormal appearance of the lungs with extensive diffuse bilateral patchy groundglass and more consolidative opacities involving the entirety of the lung parenchyma. Findings are nonspecific and may represent edema, ARDS, or multifocal infection. Clinical correlation and continued follow up is advised.   3. Status post thyroidectomy.    CXR: I have reviewed all pertinent results/findings within the past 24 hours and my personal findings are:    Diffusely increased interstitial opacities along with prominent bilateral hilar attenuation and increased pulmonary vascular markings, suggestive of pulmonary edema.    Assessment/Plan:     Active Diagnoses:    Diagnosis Date Noted POA    PRINCIPAL PROBLEM:  Acute hypoxemic respiratory failure [J96.01] 08/05/2017 Yes    Acute pulmonary edema [J81.0] 08/05/2017 Yes    Neutrophilic leukocytosis [D72.9] 08/05/2017 Yes    Hypokalemia [E87.6] 08/05/2017  Yes    Elevated troponin [R74.8] 08/05/2017 Yes    Discoid lupus [L93.0] 09/05/2012 Yes    Hypothyroidism [E03.9] 09/05/2012 Yes      Problems Resolved During this Admission:    Diagnosis Date Noted Date Resolved POA     1) Hypoxic respiratory failure - in 42 year old with reported lupus. Abnormal CXR and CTA. Presented with acute onset dyspnea. No abg. WBC is elevated. Afebrile. She does smoke. She is feeling better today on bipap. She appears quite comfortable. HIV rapid is negative. Atypical infection, CHF are possibilites. She is asking for pain meds. If she is on at home, ?aspiration. Check toxicology screen. Echo pending. Diuresis. Bronchodilators. Sputum cultures. Continue bipap. Repeat bnp in am. Needs to stop smoking.  2) Reported history of lupus. Does not appear to be on any chronic immunosuppressives as outpatient. CRP pending. ESR mildly elevated.  3) Hypothyroidism  4) History of anxiety/depression      Thank you for your consult. I will follow-up with patient. Please contact us if you have any additional questions.     Priscilla Lam MD  Pulmonology  [facility]

## 2017-08-05 NOTE — NURSING
Saw transfer order and note from MD.  Notified MD that pt still on BiPAP 10/5 50% FiO2, desats into 80s without it.  Informed MD that pt received 20mg scheduled IV lasix and an additional 40 mg IV lasix one time order, per Dr. Lam; pt is scheduled to receive another 20mg IV lasix tonight at 1800.  Potassium this morning was 4.6, repeat labs in AM.

## 2017-08-05 NOTE — H&P
[facility] Hospital Medicine  History & Physical    Patient Name: Beth Cassidy  MRN: [id number]  Admission Date: 08/05/2017  Attending Physician: Robin Beltre MD, MPH      PCP:     Mati Fabian MD    CC:     Chief Complaint   Patient presents with    Shortness of Breath     Pt reports shortness of breath that began earlier today. Pt has been treated for bronchitis in the past. Per EMS, 60 % sats on RA prior to arrival. Pt presents with 93% on breathing treatment       HISTORY OF PRESENT ILLNESS:     Beth Cassidy is a 42 y.o. female that (in part)  has a past medical history of Anxiety; Depression; Discoid lupus; Hypothyroid; Neuropathy due to systemic lupus erythematosus; and Systemic lupus erythematosus arthritis. Presents to [facility] Emergency Department c/o acute onset SOB. Pt reports nausea and sore throat onset yesterday. Upon waking this morning she had acute onset of dry cough and SOB with subjective fever and diaphoresis. Sx have remained constant since onset. SOB did not improve after albuterol treatment at home. Pt also took dose of amoxicillin at home for sore throat. No relieving factors.      Pt reports sick contact with a friend who had been vomiting recently. Pt states she has been off of steroids for 2 weeks for treatment of Lupus, as she normally alternates 2 weeks on/off. Denies any CP or leg pain/swelling. Pt has hx of neuropathy and arthritis 2/2 lupus, but has no acute changes. Pt states she has been compliant with levothyroxine.      Per EMS, 60% O2 saturation prior to arrival to ED. In the emergency department, she was w/u for acute SOB. CXR and CT chest ordered. Started on BiPAP for hypoxia and empiric treatment with ceftriaxone and azithromycin with IV Lasix.     Hospital medicine has been asked to admit to ICU for further evaluation and treatment.       REVIEW OF SYSTEMS:     -- Constitutional: subjective fever and  chills.  -- Eyes: No visual changes, diplopia, pain, tearing, blind spots, or discharge.   -- Ears, nose, mouth, throat, and face: + sore throat.  No congestion, epistaxis, d/c, bleeding gums, neck stiffness masses, or dental issues.  -- Respiratory: as above in history of present illness.  +dry cough.  -- Cardiovascular: + dyspnea with exertion. No chest pain, syncope, PND, edema, cyanosis, or palpitations.   -- Gastrointestinal: No vomiting, abdominal pain, hematemesis, melena, dyspepsia, or change in bowel habits.  -- Genitourinary: No hematuria, dysuria, frequency, urgency, nocturia, polyuria, stones, or incontinence.  -- Integument/breast: +diaphoresis. No rash, pruritis, pigmentation changes, dryness, or changes in hair  -- Hematologic/lymphatic: No easy bruising or lymphadenopathy.   -- Musculoskeletal: chronic arthritis due to lupus. No acute arthralgias, acute myalgias, joint swelling, acute limitations of ROM, or acute muscular weakness.  -- Neurological: No seizures, headaches, incoordination, paraesthesias, ataxia, vertigo, or tremors.  -- Behavioral/Psych: No auditory or visual hallucinations, depression, or suicidal/homicidal ideations.  -- Endocrine: No heat or cold intolerance, polydipsia, or unintentional weight gain / loss.  -- Allergy/Immunologic: No recurrent infections or adverse reaction to food, insects, or difficulty breathing.      PAST MEDICAL / SURGICAL HISTORY:     Past Medical History:   Diagnosis Date    Anxiety     Depression     Discoid lupus     Hypothyroid     Neuropathy due to systemic lupus erythematosus     Systemic lupus erythematosus arthritis      Past Surgical History:   Procedure Laterality Date    THYROIDECTOMY      TUBAL LIGATION           FAMILY HISTORY:     Family History   Problem Relation Age of Onset    No Known Problems Mother     No Known Problems Father          SOCIAL HISTORY:     Social History   Substance Use Topics    Smoking status: Current Every  Day Smoker     Packs/day: 0.50     Years: 0.50     Types: Cigarettes    Smokeless tobacco: Not on file    Alcohol use No     Social History     Social History    Marital status: Single     Spouse name: N/A    Number of children: N/A    Years of education: N/A     Social History Main Topics    Smoking status: Current Every Day Smoker     Packs/day: 0.50     Years: 0.50     Types: Cigarettes    Smokeless tobacco: None    Alcohol use No    Drug use: No    Sexual activity: Not Asked     Other Topics Concern    None     Social History Narrative    None         ALLERGIES:       Review of patient's allergies indicates:   Allergen Reactions    Morphine          HEALTH SCREENING:     Prevnar 13 pneumonia vaccine = no evidence of previous vaccination found in the medical record      HOME MEDICATIONS:     Prior to Admission medications    Medication Sig Start Date End Date Taking? Authorizing Provider   albuterol (ACCUNEB) 0.63 mg/3 mL Nebu Take 0.63 mg by nebulization every 6 (six) hours as needed. Rescue   Yes Historical Provider, MD   alprazolam (XANAX) 0.25 MG tablet Take 0.25 mg by mouth 3 (three) times daily.   Yes Historical Provider, MD   gabapentin (NEURONTIN) 100 MG capsule Take 100 mg by mouth 3 (three) times daily.   Yes Historical Provider, MD   levothyroxine (SYNTHROID) 150 MCG tablet Take 150 mcg by mouth once daily.     Yes Historical Provider, MD   oxycodone (ROXICODONE) 15 MG Tab Take 10 mg by mouth every 4 (four) hours as needed for Pain.   Yes Historical Provider, MD   mupirocin (BACTROBAN) 2 % ointment Apply topically 3 (three) times daily.      Historical Provider, MD   tizanidine (ZANAFLEX) 2 mg capsule Take 1 capsule (2 mg total) by mouth 3 (three) times daily. 9/10/12   Lopez Cassidy MD   trazodone (DESYREL) 100 MG tablet Take 100 mg by mouth every evening.      Historical Provider, MD          HOSPITAL MEDICATIONS:     Scheduled Meds:    azithromycin  500 mg Intravenous Q24H     cefTRIAXone (ROCEPHIN) IVPB  1 g Intravenous Q24H    enoxaparin  40 mg Subcutaneous Daily    furosemide  20 mg Intravenous BID    sodium chloride 0.9%  3 mL Intravenous Q8H     Continuous Infusions:    PRN Meds: ondansetron      PHYSICAL EXAM:     Wt Readings from Last 1 Encounters:   08/05/17 0230 107.7 kg (237 lb 7 oz)   08/04/17 1943 99.3 kg (219 lb)     Body mass index is 39.51 kg/m².  Vitals:    08/05/17 0400 08/05/17 0402 08/05/17 0404 08/05/17 0405   BP:  119/68     Pulse:   81    Resp: (!) 44   (!) 47   Temp:       TempSrc:       SpO2:   97%    Weight:       Height:              -- General appearance: well developed. appears stated age   -- Head: normocephalic, atraumatic   -- Eyes: conjunctivae clear. Extraocular muscles intact  -- Nose: Nares normal. Septum midline.   -- Mouth/Throat: lips, mucosa, and tongue normal. no throat erythema.   -- Neck: supple, symmetrical, trachea midline, no JVD and thyroid not grossly enlarged, appears symmetric  -- Lungs:tachypnea.  Rales.  On BiPAP.  Increased respiratory effort. No use of accessory muscles.   -- Chest wall: no tenderness. equal bilateral chest rise   -- Heart: regular rate and rhythm. S1, S2 normal.  no click, rub or gallop   -- Abdomen: soft, non-tender, non-distended, non-tympanic; bowel sounds normal; no masses  -- Extremities: no cyanosis, clubbing or edema.   -- Pulses: 2+ and symmetric   -- Skin: color normal, texture normal, turgor normal. No rashes or lesions.   -- Neurologic: Normal strength and tone. No focal numbness or weakness. CNII-XII intact. Liudmila coma scale: eyes open spontaneously-4, oriented & converses-5, obeys commands-6.      LABORATORY STUDIES:     Recent Results (from the past 36 hour(s))   CBC auto differential    Collection Time: 08/04/17  8:15 PM   Result Value Ref Range    WBC 18.59 (H) 3.90 - 12.70 K/uL    RBC 4.78 4.00 - 5.40 M/uL    Hemoglobin 13.4 12.0 - 16.0 g/dL    Hematocrit 41.6 37.0 - 48.5 %    MCV 87 82 - 98 fL     MCH 28.0 27.0 - 31.0 pg    MCHC 32.2 32.0 - 36.0 g/dL    RDW 15.1 (H) 11.5 - 14.5 %    Platelets 214 150 - 350 K/uL    MPV 10.5 9.2 - 12.9 fL    Gran # 17.4 (H) 1.8 - 7.7 K/uL    Lymph # 0.7 (L) 1.0 - 4.8 K/uL    Mono # 0.4 0.3 - 1.0 K/uL    Eos # 0.0 0.0 - 0.5 K/uL    Baso # 0.02 0.00 - 0.20 K/uL    Gran% 93.7 (H) 38.0 - 73.0 %    Lymph% 3.7 (L) 18.0 - 48.0 %    Mono% 2.4 (L) 4.0 - 15.0 %    Eosinophil% 0.1 0.0 - 8.0 %    Basophil% 0.1 0.0 - 1.9 %    Differential Method Automated    Brain natriuretic peptide    Collection Time: 08/04/17  8:15 PM   Result Value Ref Range     (H) 0 - 99 pg/mL   Comprehensive metabolic panel    Collection Time: 08/04/17  8:15 PM   Result Value Ref Range    Sodium 137 136 - 145 mmol/L    Potassium 3.3 (L) 3.5 - 5.1 mmol/L    Chloride 98 95 - 110 mmol/L    CO2 24 23 - 29 mmol/L    Glucose 124 (H) 70 - 110 mg/dL    BUN, Bld 10 6 - 20 mg/dL    Creatinine 1.1 0.5 - 1.4 mg/dL    Calcium 8.7 8.7 - 10.5 mg/dL    Total Protein 7.4 6.0 - 8.4 g/dL    Albumin 3.2 (L) 3.5 - 5.2 g/dL    Total Bilirubin 0.5 0.1 - 1.0 mg/dL    Alkaline Phosphatase 143 (H) 55 - 135 U/L    AST 45 (H) 10 - 40 U/L    ALT 6 (L) 10 - 44 U/L    Anion Gap 15 8 - 16 mmol/L    eGFR if African American >60 >60 mL/min/1.73 m^2    eGFR if non African American >60 >60 mL/min/1.73 m^2   Troponin I    Collection Time: 08/04/17  8:15 PM   Result Value Ref Range    Troponin I 0.048 (H) 0.000 - 0.026 ng/mL   TSH    Collection Time: 08/04/17  8:15 PM   Result Value Ref Range    TSH 1.215 0.400 - 4.000 uIU/mL   Rapid HIV    Collection Time: 08/04/17  8:15 PM   Result Value Ref Range    HIV Rapid Testing Negative Negative   POCT urine pregnancy    Collection Time: 08/04/17 10:35 PM   Result Value Ref Range    POC Preg Test, Ur Negative Negative     Acceptable Yes        Lab Results   Component Value Date    INR 0.9 05/09/2012     No results found for: HGBA1C  No results for input(s): POCTGLUCOSE in the last 72  hours.        MICROBIOLOGY DATA:     No results found for: LABGENI, LABREFE, LABUPPE, LABURIN, LABAFB    Microbiology x 7d:   Microbiology Results (last 7 days)     ** No results found for the last 168 hours. **            IMAGING:     Imaging Results          CTA Chest Non-Coronary (PE Study) (Final result)     Abnormal  Result time 08/04/17 23:30:50    Final result by Cathy Sigala MD (08/04/17 23:30:50)                 Impression:        1.No evidence of pulmonary thromboembolism.    2. Abnormal appearance of the lungs with extensive diffuse bilateral patchy groundglass and more consolidative opacities involving the entirety of the lung parenchyma. Findings are nonspecific and may represent edema, ARDS, or multifocal infection. Clinical correlation and continued followup is advised.    3. Status post thyroidectomy.    This report has been flagged in the [facility] medical record.      Electronically signed by: CATHY SIGALA  Date:     08/04/17  Time:    23:30              Narrative:    Technique: The chest was surveyed from the costophrenic angles through the lung apices at 3-mm increments after the administration of 70 cc of Omnipaque 350 intravenous contrast material according to the PE protocol.  Axial, sagittal and coronal maximum intensity projection images were reviewed.    Comparison:Chest radiograph 8/4/2017.    Findings:    The structures at the base of the neck demonstrate postsurgical changes of prior thyroidectomy.    The thoracic aorta maintains normal caliber, contour, and course without significant atherosclerotic calcification within its course.  There is no evidence of aneurysmal dilation or dissection. The heart is not enlarged and there is no evidence of pericardial effusion.The esophagus maintains a normal course and caliber.There are scattered mildly prominent mediastinal lymph nodes. There is no significant axillary lymphadenopathy.      The trachea is midline and proximal airways are  patent. The lungs demonstrate diffuse extensive patchy groundglass and more consolidative opacities involving the entirety of the lung parenchyma. There is no pneumothorax. There is no pleural effusion.     The pulmonary arteries distribute normally without evidence of filling defect to indicate pulmonary thromboembolism.     Limited images of the upper abdomen obtained during the course of this dedicated thoracic CT demonstrates no evidence of free intraperitoneal air or other acute abnormality.    The osseous structures are unremarkable.                             X-Ray Chest PA And Lateral (Final result)  Result time 08/04/17 21:37:12    Final result by Prashant Johnson MD (08/04/17 21:37:12)                 Impression:      Diffusely increased interstitial opacities along with prominent bilateral hilar attenuation and increased pulmonary vascular markings, suggestive of pulmonary edema.  ______________________________________     Electronically signed by resident: BOWEN BORDEN MD  Date:     08/04/17  Time:    21:21            As the supervising and teaching physician, I personally reviewed the images and resident's interpretation and I agree with the findings.          Electronically signed by: PRASHANT JOHNSON MD  Date:     08/04/17  Time:    21:37              Narrative:    CHEST XRAY, TWO VIEWS, PA AND LATERAL    INDICATION:  Shortness of breath.    COMPARISON:  No available priors.    FINDINGS:  Surgical clips noted in the lower neck. Cardiomediastinal silhouette is at the upper limits of normal in size. Trachea is midline. There are prominent bilateral hilar opacities. Pulmonary vascular markings is increased. The lungs are equally well expanded. There are diffusely increased interstitial opacities with a bibasilar predominance, suggestive of edema. No pneumothorax or pleural effusion. Visualized osseous structures are unremarkable. Soft tissues are without significant abnormality. Visualized upper abdomen is  unremarkable. No pneumoperitoneum.                                CONSULTS:     None       ASSESSMENT & PLAN:     Primary Diagnosis:  Acute hypoxemic respiratory failure    Active Hospital Problems    Diagnosis  POA    *Acute hypoxemic respiratory failure [J96.01]  Yes     Priority: 1 - High    Acute pulmonary edema [J81.0]  Yes     Priority: 2     Neutrophilic leukocytosis [D72.9]  Yes     Priority: 3     Hypokalemia [E87.6]  Yes    Elevated troponin [R74.8]  Yes    Discoid lupus [L93.0]  Yes    Hypothyroidism [E03.9]  Yes      Resolved Hospital Problems    Diagnosis Date Resolved POA   No resolved problems to display.         Acute hypoxemic respiratory failure  · Differential is broad but highly suspicious for infectious etiology given her history, neurotrophilic leukocytosis, and CXR and CT findings.  · Differential includes:  interstitial/atypical PNA (chlamydia, mycoplasma, PCP, CMV, HIV) vs ARDS/noncardiogenic pulmonary edema vs SLE pneumonitis vs acute heart failure 2/2 hypthyroidism vs Lupus induced heart failure (restrictive cardiomyopathy - coronary vs myocardial vs pericardial) vs PE  ·  WBC 18.39, 93.7% granulocytes strongly suggestive of bacterial infection  ·  BNP - 383, equivocal for heart failure  ·  CXR, CTA - No PE. Findings are nonspecific and may represent edema, ARDS, or multifocal infection.   ·  Continue BiPAP  ·  Started empiric broad spectrum abx with ceftriaxone and azithromycin.  ·  Lasix for pulmonary edema   ·  Pulmonology consult for recommendations and respiratory sampling  ·  2D Echo to evaluate for SLE flair and possibly future biopsy    Acute pulmonary edema  · Evidenced by history, physical exam, ABG with hypoxia, and imaging  · Trial of diuresis  · Continue supplemental oxygen and BiPAP  · Evaluation as above      Neutrophilic leukocytosis  Likely due to infectious etiology as  Outlined above      Discoid lupus  Holding steroids for now given possibility of acute  infection.      Hypothyroidism  · Clinically, patient is euthyroid   · Chemically, appears euthyroid  · TSH = 1.215  · Continue current regimen      Hypokalemia  · Due to GI losses or poor oral intake  · Check magnesium  · Replace potassium orally if possible, if not then IV  · Monitor with serial labs      Elevated troponin  Minimally elevated troponin.  We will trend.  Denies chest pain.  CHF?          VTE Risk Mitigation         Ordered     enoxaparin injection 40 mg  Daily     Route:  Subcutaneous        08/05/17 0231     Medium Risk of VTE  Once      08/05/17 0231     Place sequential compression device  Until discontinued      08/05/17 0231     Place MISA hose  Until discontinued      08/05/17 0231            Adult PRN medications available   DVT prophylaxis given       DISPOSITION:     Will admit to the Hospital Medicine service for further evaluation and treatment.    Chart reviewed and updated where applicable.    High Risk Conditions:  Patient has a condition that poses threat to life and bodily function: acute respiratory distress      ===============================================================    Robin Beltre MD, MPH  Department of Hospital Medicine   [facility]  [phone]  (7pm - 6am)          This note is dictated using Dragon Medical 360 voice recognition software.  There are word recognition mistakes that are occasionally missed on review.

## 2017-08-05 NOTE — ASSESSMENT & PLAN NOTE
· Clinically, patient is euthyroid   · Chemically, appears euthyroid  · TSH = 1.215  · Continue current regimen

## 2017-08-05 NOTE — NURSING
Spoke with Dr. Baird.  After getting off of bedpan, pt began complaining of chest pain when taking deep breaths in or coughing.  No EKG changes able to be seen on monitor.  Echo being completed at bedside now.   No PRN meds-was told that nothing was ordered d/t concerns for oversedation on BiPAP.  MD gave orders for tylenol 650 mg q8hr for pain, headache or temp greater than 100.4, and wants to try that now.  MD said she will be up to see pt in a few minutes.  Also clarified one time dose of 40 mEq PO KCl this morning.  Was 3.3 when ordered, now 4.6. MD said to not give this dose of potassium.

## 2017-08-05 NOTE — PLAN OF CARE
Problem: Patient Care Overview  Goal: Plan of Care Review  Outcome: Ongoing (interventions implemented as appropriate)  Pt is drowsy but easily aroused. Arrived on unit w/ BiPAP in place. Pt tolerating well. Currently stable and appears comfortable. NPO. Voids per bedpan. Pt remains free of falls. Nonskid socks on. Bed in lowest position. Call bell w/in reach. Pt instructed to call if assistance is needed. Will continue to monitor.

## 2017-08-05 NOTE — PROVIDER TRANSFER
Ms. Cassidy was admitted for acute hypoxic respiratory failure with pulmonary edema and likely pneumonia. CTA chest negative for PE. Echo unremarkable. She was weaned off BiPAP and stable for transfer to the floor. Afebrile, leukocytosis improved. Continue azithromycin and ceftriaxone for CAP.

## 2017-08-05 NOTE — HPI
Ms. Cassidy is a 42 y.o with discoid lupus, chronic prescription opiate use, anxiety, depression, neuropathy, and hypothyroidism who presented 8/4/2017 for shortness of breath. Please see H&P for full detail.

## 2017-08-06 LAB
ALLENS TEST: ABNORMAL
ALLENS TEST: ABNORMAL
ANION GAP SERPL CALC-SCNC: 14 MMOL/L
BASOPHILS # BLD AUTO: 0.02 K/UL
BASOPHILS NFR BLD: 0.1 %
BNP SERPL-MCNC: 116 PG/ML
BUN SERPL-MCNC: 13 MG/DL
CALCIUM SERPL-MCNC: 9.3 MG/DL
CHLORIDE SERPL-SCNC: 92 MMOL/L
CO2 SERPL-SCNC: 31 MMOL/L
CREAT SERPL-MCNC: 1 MG/DL
DELSYS: ABNORMAL
DELSYS: ABNORMAL
DIFFERENTIAL METHOD: ABNORMAL
EOSINOPHIL # BLD AUTO: 0.2 K/UL
EOSINOPHIL NFR BLD: 0.9 %
ERYTHROCYTE [DISTWIDTH] IN BLOOD BY AUTOMATED COUNT: 15.4 %
EST. GFR  (AFRICAN AMERICAN): >60 ML/MIN/1.73 M^2
EST. GFR  (NON AFRICAN AMERICAN): >60 ML/MIN/1.73 M^2
FIO2: 55
FLOW: 10
FLOW: 14
GLUCOSE SERPL-MCNC: 110 MG/DL
HCO3 UR-SCNC: 33.9 MMOL/L (ref 24–28)
HCO3 UR-SCNC: 34.5 MMOL/L (ref 24–28)
HCT VFR BLD AUTO: 42 %
HCT VFR BLD CALC: 42 %PCV (ref 36–54)
HGB BLD-MCNC: 13.8 G/DL
LYMPHOCYTES # BLD AUTO: 1.5 K/UL
LYMPHOCYTES NFR BLD: 8.8 %
MAGNESIUM SERPL-MCNC: 2.1 MG/DL
MCH RBC QN AUTO: 28.1 PG
MCHC RBC AUTO-ENTMCNC: 32.9 G/DL
MCV RBC AUTO: 86 FL
MODE: ABNORMAL
MODE: ABNORMAL
MONOCYTES # BLD AUTO: 0.3 K/UL
MONOCYTES NFR BLD: 1.9 %
NEUTROPHILS # BLD AUTO: 15.4 K/UL
NEUTROPHILS NFR BLD: 88.1 %
PCO2 BLDA: 44 MMHG (ref 35–45)
PCO2 BLDA: 47.3 MMHG (ref 35–45)
PH SMN: 7.47 [PH] (ref 7.35–7.45)
PH SMN: 7.5 [PH] (ref 7.35–7.45)
PHOSPHATE SERPL-MCNC: 2.1 MG/DL
PLATELET # BLD AUTO: 255 K/UL
PMV BLD AUTO: 11.1 FL
PO2 BLDA: 45 MMHG (ref 80–100)
PO2 BLDA: 49 MMHG (ref 80–100)
POC BE: 9 MMOL/L
POC BE: 9 MMOL/L
POC IONIZED CALCIUM: 1.07 MMOL/L (ref 1.06–1.42)
POC SATURATED O2: 83 % (ref 95–100)
POC SATURATED O2: 87 % (ref 95–100)
POC TCO2: 35 MMOL/L (ref 23–27)
POC TCO2: 36 MMOL/L (ref 23–27)
POTASSIUM BLD-SCNC: 3.5 MMOL/L (ref 3.5–5.1)
POTASSIUM SERPL-SCNC: 3.6 MMOL/L
RBC # BLD AUTO: 4.91 M/UL
SAMPLE: ABNORMAL
SAMPLE: ABNORMAL
SITE: ABNORMAL
SITE: ABNORMAL
SODIUM BLD-SCNC: 134 MMOL/L (ref 136–145)
SODIUM SERPL-SCNC: 137 MMOL/L
SP02: 96
WBC # BLD AUTO: 17.47 K/UL

## 2017-08-06 PROCEDURE — 85025 COMPLETE CBC W/AUTO DIFF WBC: CPT

## 2017-08-06 PROCEDURE — 63600175 PHARM REV CODE 636 W HCPCS: Performed by: EMERGENCY MEDICINE

## 2017-08-06 PROCEDURE — 80048 BASIC METABOLIC PNL TOTAL CA: CPT

## 2017-08-06 PROCEDURE — 25000003 PHARM REV CODE 250: Performed by: INTERNAL MEDICINE

## 2017-08-06 PROCEDURE — 25000003 PHARM REV CODE 250: Performed by: EMERGENCY MEDICINE

## 2017-08-06 PROCEDURE — 99900035 HC TECH TIME PER 15 MIN (STAT)

## 2017-08-06 PROCEDURE — 83880 ASSAY OF NATRIURETIC PEPTIDE: CPT

## 2017-08-06 PROCEDURE — A4216 STERILE WATER/SALINE, 10 ML: HCPCS | Performed by: EMERGENCY MEDICINE

## 2017-08-06 PROCEDURE — 25000242 PHARM REV CODE 250 ALT 637 W/ HCPCS: Performed by: HOSPITALIST

## 2017-08-06 PROCEDURE — 94660 CPAP INITIATION&MGMT: CPT

## 2017-08-06 PROCEDURE — 27000221 HC OXYGEN, UP TO 24 HOURS

## 2017-08-06 PROCEDURE — 20000000 HC ICU ROOM

## 2017-08-06 PROCEDURE — 27000190 HC CPAP FULL FACE MASK W/VALVE

## 2017-08-06 PROCEDURE — 36415 COLL VENOUS BLD VENIPUNCTURE: CPT

## 2017-08-06 PROCEDURE — 63600175 PHARM REV CODE 636 W HCPCS: Performed by: INTERNAL MEDICINE

## 2017-08-06 PROCEDURE — 82803 BLOOD GASES ANY COMBINATION: CPT

## 2017-08-06 PROCEDURE — 27100171 HC OXYGEN HIGH FLOW UP TO 24 HOURS

## 2017-08-06 PROCEDURE — 83735 ASSAY OF MAGNESIUM: CPT

## 2017-08-06 PROCEDURE — 94640 AIRWAY INHALATION TREATMENT: CPT

## 2017-08-06 PROCEDURE — 84100 ASSAY OF PHOSPHORUS: CPT

## 2017-08-06 PROCEDURE — 94761 N-INVAS EAR/PLS OXIMETRY MLT: CPT

## 2017-08-06 PROCEDURE — 25000003 PHARM REV CODE 250: Performed by: HOSPITALIST

## 2017-08-06 PROCEDURE — 94799 UNLISTED PULMONARY SVC/PX: CPT

## 2017-08-06 PROCEDURE — 36600 WITHDRAWAL OF ARTERIAL BLOOD: CPT

## 2017-08-06 RX ORDER — POTASSIUM CHLORIDE 20 MEQ/1
40 TABLET, EXTENDED RELEASE ORAL ONCE
Status: COMPLETED | OUTPATIENT
Start: 2017-08-06 | End: 2017-08-06

## 2017-08-06 RX ORDER — DOCUSATE SODIUM 100 MG/1
100 CAPSULE, LIQUID FILLED ORAL DAILY
Status: DISCONTINUED | OUTPATIENT
Start: 2017-08-06 | End: 2017-08-07 | Stop reason: HOSPADM

## 2017-08-06 RX ADMIN — IPRATROPIUM BROMIDE AND ALBUTEROL SULFATE 3 ML: .5; 3 SOLUTION RESPIRATORY (INHALATION) at 07:08

## 2017-08-06 RX ADMIN — Medication 3 ML: at 10:08

## 2017-08-06 RX ADMIN — GUAIFENESIN AND DEXTROMETHORPHAN 10 ML: 100; 10 SYRUP ORAL at 09:08

## 2017-08-06 RX ADMIN — FUROSEMIDE 60 MG: 10 INJECTION, SOLUTION INTRAMUSCULAR; INTRAVENOUS at 05:08

## 2017-08-06 RX ADMIN — IPRATROPIUM BROMIDE AND ALBUTEROL SULFATE 3 ML: .5; 3 SOLUTION RESPIRATORY (INHALATION) at 01:08

## 2017-08-06 RX ADMIN — OXYCODONE HYDROCHLORIDE 5 MG: 5 TABLET ORAL at 02:08

## 2017-08-06 RX ADMIN — POTASSIUM CHLORIDE 40 MEQ: 1500 TABLET, EXTENDED RELEASE ORAL at 09:08

## 2017-08-06 RX ADMIN — GUAIFENESIN AND DEXTROMETHORPHAN 10 ML: 100; 10 SYRUP ORAL at 02:08

## 2017-08-06 RX ADMIN — FUROSEMIDE 60 MG: 10 INJECTION, SOLUTION INTRAMUSCULAR; INTRAVENOUS at 09:08

## 2017-08-06 RX ADMIN — LEVOTHYROXINE SODIUM 150 MCG: 150 TABLET ORAL at 05:08

## 2017-08-06 RX ADMIN — OXYCODONE HYDROCHLORIDE 5 MG: 5 TABLET ORAL at 09:08

## 2017-08-06 RX ADMIN — ENOXAPARIN SODIUM 40 MG: 100 INJECTION SUBCUTANEOUS at 05:08

## 2017-08-06 RX ADMIN — CEFTRIAXONE 1 G: 1 INJECTION, SOLUTION INTRAVENOUS at 10:08

## 2017-08-06 RX ADMIN — POTASSIUM CHLORIDE 40 MEQ: 1500 TABLET, EXTENDED RELEASE ORAL at 01:08

## 2017-08-06 RX ADMIN — Medication 3 ML: at 05:08

## 2017-08-06 RX ADMIN — OXYCODONE HYDROCHLORIDE 5 MG: 5 TABLET ORAL at 05:08

## 2017-08-06 RX ADMIN — GUAIFENESIN AND DEXTROMETHORPHAN 10 ML: 100; 10 SYRUP ORAL at 05:08

## 2017-08-06 RX ADMIN — DOCUSATE SODIUM 100 MG: 100 CAPSULE, LIQUID FILLED ORAL at 03:08

## 2017-08-06 RX ADMIN — AZITHROMYCIN MONOHYDRATE 500 MG: 500 INJECTION, POWDER, LYOPHILIZED, FOR SOLUTION INTRAVENOUS at 11:08

## 2017-08-06 RX ADMIN — Medication 3 ML: at 01:08

## 2017-08-06 NOTE — ASSESSMENT & PLAN NOTE
She has chronic pain.  with 33 Rx in the past year. Most recently Soma and hydrocodone/APAP 10/325. Will give PRN oxicodone 5 Q6H while admitted but she should follow up with pain doctor as well as rheumatology upon discharge.

## 2017-08-06 NOTE — ASSESSMENT & PLAN NOTE
Holding steroids for now given possibility of acute infection. Had stopped following with rheum as her provider stopped seeing medicaid.

## 2017-08-06 NOTE — HOSPITAL COURSE
Ms. Cassidy was admitted for acute hypoxic respiratory failure with pulmonary edema and likely pneumonia. CTA chest negative for PE. Echo unremarkable. She was slowly weaned off BiPAP but still had difficulty maintaining O2 sats. Afebrile, leukocytosis stable (on prednisone). Continue azithromycin and ceftriaxone for CAP. Continued aggressive diuresis. BNP improved. Repeat CXR 8/6/2017 without much improvement. She continued to require NC to maintain sats in the low 90s. She was insistent that she be discharged so she could make her court date on Wednesday. I explained that her respiratory status is still very poor and we cannot recommend discharge if she is still so severely hypoxic. She understood the risk of death if she were to leave the hospital at this time. She had capacity and signed herself out AMA.  Of note, there were some odd behaviors such as her somnolence without clear reason the day following admission. She has extensive record in the  for Soma, Fentanyl, etc. She was also noted to have very difficult to maintain venous access and nursing commented that she was very protective of her bag. I'm concerned for opiate abuse/addiciton and surreptitious use whether it be prescription, IV, or both.

## 2017-08-06 NOTE — PLAN OF CARE
Problem: Patient Care Overview  Goal: Plan of Care Review  Outcome: Ongoing (interventions implemented as appropriate)  Pt remains in ICU. VSS. Afebrile. NSR on tele monitor.  Pt on venti mask 55% most of day, 10-12L [place] NC while eating, and BiPap while sleeping. Reports breathing is comfortable.  AAOx4. Heart healthy diet started today-some PO intake-no nausea.  Voids per bedpan, 1 BM today.  PIV in right forearm infiltrated today, new PIV able to be obtained by house supervisor in [facility] with use of ultrasound.  No falls/injuries/skin breakdown this shift.  POC reviewed with patient.

## 2017-08-06 NOTE — PLAN OF CARE
Problem: Patient Care Overview  Goal: Plan of Care Review  Outcome: Ongoing (interventions implemented as appropriate)  Pt is AAO. Pt placed on venti mask to see how she would do overnight. Pt stayed on venti mask for approximately 6 hours before sats began dropping to upper 80s again. BiPAP currently in place @50%. Pt tolerating well. NPO. Voids per bedpan. Pt remains free of falls. Nonskid socks on. Bed in lowest position. Call bell w/in reach. Pt instructed to call if assistance is needed. Will continue to monitor.

## 2017-08-06 NOTE — NURSING
House supervisor at bedside with ultrasound to try and place new PIV.  Primary RN and charge RN attempted to place PIV with no success.  Pt's only peripheral IV infiltrated after administering 30mg IV lasix.  Waiting to administer remaining 30mg once new IV is placed.

## 2017-08-06 NOTE — NURSING
Respiratory placed pt on 10 L Hi Flow NC with humidification to allow pt to eat.  SpO2 93% will continue to monitor.  Respiratory reports that pt pulled 750cc x 10 on IS.

## 2017-08-06 NOTE — SUBJECTIVE & OBJECTIVE
Review of Systems   Constitutional: Positive for fatigue. Negative for fever.   HENT: Positive for congestion. Negative for sore throat.    Eyes: Negative for photophobia and visual disturbance.   Respiratory: Positive for cough and shortness of breath.    Cardiovascular: Negative for chest pain and leg swelling.   Gastrointestinal: Negative for nausea and vomiting.   Genitourinary: Negative for difficulty urinating and dysuria.   Musculoskeletal: Negative for joint swelling and neck stiffness.   Skin: Negative for rash and wound.   Neurological: Negative for dizziness and light-headedness.     Objective:     Vital Signs (Most Recent):  Temp: 97.8 °F (36.6 °C) (08/06/17 1515)  Pulse: 88 (08/06/17 1800)  Resp: (!) 35 (08/06/17 1800)  BP: (!) 150/61 (08/06/17 1800)  SpO2: (!) 94 % (08/06/17 1800) Vital Signs (24h Range):  Temp:  [97.8 °F (36.6 °C)-99.3 °F (37.4 °C)] 97.8 °F (36.6 °C)  Pulse:  [66-97] 88  Resp:  [22-38] 35  SpO2:  [86 %-100 %] 94 %  BP: (111-150)/() 150/61     Weight: 104.4 kg (230 lb 2.6 oz)  Body mass index is 38.3 kg/m².    Intake/Output Summary (Last 24 hours) at 08/06/17 1842  Last data filed at 08/06/17 1831   Gross per 24 hour   Intake              730 ml   Output             1825 ml   Net            -1095 ml      Physical Exam   Constitutional: She is oriented to person, place, and time. She appears well-developed and well-nourished. No distress.   HENT:   Right Ear: External ear normal.   Left Ear: External ear normal.   Nose: Nose normal.   Eyes: EOM are normal. Pupils are equal, round, and reactive to light. No scleral icterus.   Neck: Normal range of motion. Neck supple. No thyromegaly present.   Cardiovascular: Normal rate and normal heart sounds.  Exam reveals no friction rub.    No murmur heard.  Pulmonary/Chest: She is in respiratory distress. She has no wheezes. She has rales.   Abdominal: Soft. Bowel sounds are normal. She exhibits no mass. There is no tenderness.   No  hepatosplenomegaly   Musculoskeletal: Normal range of motion. She exhibits no edema or deformity.   Neurological: She is alert and oriented to person, place, and time. No cranial nerve deficit.   Skin: Skin is warm and dry. No pallor.   Psychiatric:   Flat affect       Significant Labs:   CBC:   Recent Labs  Lab 08/04/17 2015 08/05/17  0557 08/06/17  0146 08/06/17  0342   WBC 18.59* 15.48*  --  17.47*   HGB 13.4 13.7  --  13.8   HCT 41.6 42.0 42 42.0    220  --  255     CMP:   Recent Labs  Lab 08/04/17 2015 08/05/17  0556 08/06/17  0342    135* 137   K 3.3* 4.6 3.6   CL 98 100 92*   CO2 24 24 31*   * 128* 110   BUN 10 10 13   CREATININE 1.1 1.1 1.0   CALCIUM 8.7 9.3 9.3   PROT 7.4  --   --    ALBUMIN 3.2*  --   --    BILITOT 0.5  --   --    ALKPHOS 143*  --   --    AST 45*  --   --    ALT 6*  --   --    ANIONGAP 15 11 14   EGFRNONAA >60 >60 >60     Magnesium:   Recent Labs  Lab 08/06/17  0342   MG 2.1     Troponin:   Recent Labs  Lab 08/04/17 2015   TROPONINI 0.048*       Significant Imaging: I have reviewed all pertinent imaging results/findings within the past 24 hours.

## 2017-08-06 NOTE — PLAN OF CARE
Problem: Patient Care Overview  Goal: Plan of Care Review  Outcome: Ongoing (interventions implemented as appropriate)  Pt remains in ICU on BiPap.  VSS. Afebrile.  FiO2 able to be weaned down to 50%-attempted venti mask, but unsuccessful, night shift to try again.  Echo completed today.  Voids per bedpan, some dyspnea on exertion with coming on and off bedpan.  No BMs this shift.  PIVs remain in place.  No falls/injuries/skin breakdown this shift.

## 2017-08-06 NOTE — ASSESSMENT & PLAN NOTE
Diuresed. Nebs and steroids and treating presumptively for pneumonia. Slow to improve. Appreciate pulm recs. Echo w/ EF 55%, mild AR. Normal PAP. Sputum cultures pending. Repeat CXR 8/6/2017.

## 2017-08-06 NOTE — NURSING
Spoke with Dr. Jonathan Hamm regarding scheduled 60mg IV lasix BID and potassium decrease from 4.6 8/5 to 3.6 8/6.  MD said to order 40 mEq PO KCl pills [id number], given 4hrs apart.  Updated MD that pt is on venti mask at 55%, doing okay on that.  MD said to enter order for heart healthy diet.

## 2017-08-06 NOTE — PROGRESS NOTES
[facility]  Pulmonology  Progress Note    Patient Name: Beth Cassidy  MRN: [id number]  Admission Date: 8/4/2017  Hospital Length of Stay: 1 days  Code Status: Full Code  Attending Provider: Malina Baird MD  Primary Care Provider: Mati Fabian MD   Principal Problem: Acute hypoxemic respiratory failure    Subjective:     Interval History: Feels better today. On VFM. Off bipap. She states she has not had fu for lupus in over 5 years - stopped going to [facility] as they to not accept medicaid per patient.    Objective:     Vital Signs (Most Recent):  Temp: 98.4 °F (36.9 °C) (08/06/17 0715)  Pulse: 76 (08/06/17 0800)  Resp: (!) 24 (08/06/17 0748)  BP: 125/62 (08/06/17 0800)  SpO2: (!) 94 % (08/06/17 0800) Vital Signs (24h Range):  Temp:  [96.9 °F (36.1 °C)-99.3 °F (37.4 °C)] 98.4 °F (36.9 °C)  Pulse:  [66-94] 76  Resp:  [23-38] 24  SpO2:  [81 %-100 %] 94 %  BP: (106-151)/(56-92) 125/62     Weight: 104.4 kg (230 lb 2.6 oz)  Body mass index is 38.3 kg/m².      Intake/Output Summary (Last 24 hours) at 08/06/17 0832  Last data filed at 08/06/17 0400   Gross per 24 hour   Intake              610 ml   Output             3000 ml   Net            -2390 ml       Physical Exam   Constitutional: She appears well-developed and well-nourished. No distress.   obese   HENT:   Head: Normocephalic and atraumatic.   Neck: Neck supple.   Cardiovascular: Normal rate and regular rhythm.    No murmur heard.  Pulmonary/Chest: She has rales.   Weak, shallow efforts and mildly dyspneic   Abdominal: Soft. Bowel sounds are normal. There is no tenderness.   Musculoskeletal: She exhibits no edema or deformity.   Neurological: She is alert.   Skin: Skin is warm and dry.   Psychiatric: Her behavior is normal.       Vents:  Oxygen Concentration (%): 55 (08/06/17 0800)    Lines/Drains/Airways     Peripheral Intravenous Line                 Peripheral IV - Single Lumen 08/05/17 0209 Right Forearm 1 day                 Significant Labs:    CBC/Anemia Profile:    Recent Labs  Lab 08/04/17 2015 08/05/17  0557 08/06/17  0146 08/06/17  0342   WBC 18.59* 15.48*  --  17.47*   HGB 13.4 13.7  --  13.8   HCT 41.6 42.0 42 42.0    220  --  255   MCV 87 87  --  86   RDW 15.1* 15.1*  --  15.4*        Chemistries:    Recent Labs  Lab 08/04/17 2015 08/05/17  0556 08/06/17  0342    135* 137   K 3.3* 4.6 3.6   CL 98 100 92*   CO2 24 24 31*   BUN 10 10 13   CREATININE 1.1 1.1 1.0   CALCIUM 8.7 9.3 9.3   ALBUMIN 3.2*  --   --    PROT 7.4  --   --    BILITOT 0.5  --   --    ALKPHOS 143*  --   --    ALT 6*  --   --    AST 45*  --   --    MG  --   --  2.1   PHOS  --   --  2.1*       Recent Labs  Lab 08/06/17  0146   PH 7.471*   PCO2 47.3*   PO2 45*   HCO3 34.5*   POCSATURATED 83*   BE 9       Significant Imaging:  CXR: I have reviewed all pertinent results/findings within the past 24 hours and my personal findings are:  8/5/17 with some improvement in bilateral infiltrates     Echo 8/5/17    1 - TDS.     2 - Normal left ventricular systolic function (EF 55-60%).     3 - No diagnostic regional wall motion abnormalities.     4 - Concentric remodeling.     5 - Mild aortic regurgitation.     Assessment/Plan:     Active Diagnoses:    Diagnosis Date Noted POA    PRINCIPAL PROBLEM:  Acute hypoxemic respiratory failure [J96.01] 08/05/2017 Yes    Acute pulmonary edema [J81.0] 08/05/2017 Yes    Neutrophilic leukocytosis [D72.9] 08/05/2017 Yes    Hypokalemia [E87.6] 08/05/2017 Yes    Elevated troponin [R74.8] 08/05/2017 Yes    Discoid lupus [L93.0] 09/05/2012 Yes    Hypothyroidism [E03.9] 09/05/2012 Yes      Problems Resolved During this Admission:    Diagnosis Date Noted Date Resolved POA       1) Hypoxic respiratory failure - in 42 year old with reported lupus. Abnormal CXR and CTA. Presented with acute onset dyspnea and hypoxia. ABG as above. Is a bit better today. CXR improved yesterday. Negative balance. Will try repeat cxr with  diruesis. Continue empiric abx. WBC is elevated but stable. Afebrile. She does smoke. HIV rapid is negative. Atypical infection, CHF are possibilites. However, bnp was normal and echo as above. She was asking for pain meds. Tox screen is still pending. Bronchodilators. Sputum culture pending. Continue bipap. IS to bedside. Repeat cxr in am. Diuresis.  2) Reported history of lupus. Is not on any chronic immunosuppressives as outpatient. CRP elevated. ESR mildly elevated. Has not had fu in years. Will need fuw ith rheumatology.  3) Hypothyroidism  4) History of anxiety/depression     Priscilla Lam MD  Pulmonology  [facility] - [place], [place]

## 2017-08-06 NOTE — PROGRESS NOTES
[facility] Hospital Medicine  Progress Note    Patient Name: Beth Cassidy  MRN: [id number]  Patient Class: IP- Inpatient   Admission Date: 8/4/2017  Length of Stay: 1 days  Attending Physician: Malina Baird MD  Primary Care Provider: Mati Fabian MD        Subjective:     Principal Problem:Acute hypoxemic respiratory failure    HPI:  Ms. Cassidy is a 42 y.o with discoid lupus, chronic prescription opiate use, anxiety, depression, neuropathy, and hypothyroidism who presented 8/4/2017 for shortness of breath. Please see H&P for full detail.    Hospital Course:  Ms. Cassidy was admitted for acute hypoxic respiratory failure with pulmonary edema and likely pneumonia. CTA chest negative for PE. Echo unremarkable. She was slowly weaned off BiPAP but still had difficulty maintaining O2 sats. Afebrile, leukocytosis stable (on prednisone). Continue azithromycin and ceftriaxone for CAP. Continued aggressive diuresis. BNP improved. Repeat CXR 8/6/2017.      Review of Systems   Constitutional: Positive for fatigue. Negative for fever.   HENT: Positive for congestion. Negative for sore throat.    Eyes: Negative for photophobia and visual disturbance.   Respiratory: Positive for cough and shortness of breath.    Cardiovascular: Negative for chest pain and leg swelling.   Gastrointestinal: Negative for nausea and vomiting.   Genitourinary: Negative for difficulty urinating and dysuria.   Musculoskeletal: Negative for joint swelling and neck stiffness.   Skin: Negative for rash and wound.   Neurological: Negative for dizziness and light-headedness.     Objective:     Vital Signs (Most Recent):  Temp: 97.8 °F (36.6 °C) (08/06/17 1515)  Pulse: 88 (08/06/17 1800)  Resp: (!) 35 (08/06/17 1800)  BP: (!) 150/61 (08/06/17 1800)  SpO2: (!) 94 % (08/06/17 1800) Vital Signs (24h Range):  Temp:  [97.8 °F (36.6 °C)-99.3 °F (37.4 °C)] 97.8 °F (36.6 °C)  Pulse:  [66-97] 88  Resp:  [22-38] 35  SpO2:  [86  %-100 %] 94 %  BP: (111-150)/() 150/61     Weight: 104.4 kg (230 lb 2.6 oz)  Body mass index is 38.3 kg/m².    Intake/Output Summary (Last 24 hours) at 08/06/17 1842  Last data filed at 08/06/17 1831   Gross per 24 hour   Intake              730 ml   Output             1825 ml   Net            -1095 ml      Physical Exam   Constitutional: She is oriented to person, place, and time. She appears well-developed and well-nourished. No distress.   HENT:   Right Ear: External ear normal.   Left Ear: External ear normal.   Nose: Nose normal.   Eyes: EOM are normal. Pupils are equal, round, and reactive to light. No scleral icterus.   Neck: Normal range of motion. Neck supple. No thyromegaly present.   Cardiovascular: Normal rate and normal heart sounds.  Exam reveals no friction rub.    No murmur heard.  Pulmonary/Chest: She is in respiratory distress. She has no wheezes. She has rales.   Abdominal: Soft. Bowel sounds are normal. She exhibits no mass. There is no tenderness.   No hepatosplenomegaly   Musculoskeletal: Normal range of motion. She exhibits no edema or deformity.   Neurological: She is alert and oriented to person, place, and time. No cranial nerve deficit.   Skin: Skin is warm and dry. No pallor.   Psychiatric:   Flat affect       Significant Labs:   CBC:   Recent Labs  Lab 08/04/17 2015 08/05/17  0557 08/06/17  0146 08/06/17  0342   WBC 18.59* 15.48*  --  17.47*   HGB 13.4 13.7  --  13.8   HCT 41.6 42.0 42 42.0    220  --  255     CMP:   Recent Labs  Lab 08/04/17 2015 08/05/17  0556 08/06/17  0342    135* 137   K 3.3* 4.6 3.6   CL 98 100 92*   CO2 24 24 31*   * 128* 110   BUN 10 10 13   CREATININE 1.1 1.1 1.0   CALCIUM 8.7 9.3 9.3   PROT 7.4  --   --    ALBUMIN 3.2*  --   --    BILITOT 0.5  --   --    ALKPHOS 143*  --   --    AST 45*  --   --    ALT 6*  --   --    ANIONGAP 15 11 14   EGFRNONAA >60 >60 >60     Magnesium:   Recent Labs  Lab 08/06/17  0342   MG 2.1     Troponin:    Recent Labs  Lab 08/04/17 2015   TROPONINI 0.048*       Significant Imaging: I have reviewed all pertinent imaging results/findings within the past 24 hours.    Assessment/Plan:      * Acute hypoxemic respiratory failure    Diuresed. Nebs and steroids and treating presumptively for pneumonia. Slow to improve. Appreciate pulm recs. Echo w/ EF 55%, mild AR. Normal PAP. Sputum cultures pending. Repeat CXR 8/6/2017.        Acute pulmonary edema    Diuresing with IV lasix.         Neutrophilic leukocytosis    Concern for pneumonia on admit. On ceftriaxone. No BCx sent. SCx pending.        Elevated troponin    Minimally elevated troponin. Denied chest pain. EKG unremarkable. Monitor on tele.        Hypokalemia    Replete and monitor.        Hypothyroidism    TSH 1.2. Continued home meds.        Pain    She has chronic pain.  with 33 Rx in the past year. Most recently Soma and hydrocodone/APAP 10/325. Will give PRN oxicodone 5 Q6H while admitted but she should follow up with pain doctor as well as rheumatology upon discharge.        Discoid lupus    Holding steroids for now given possibility of acute infection. Had stopped following with rheum as her provider stopped seeing medicaid.          VTE Risk Mitigation         Ordered     enoxaparin injection 40 mg  Daily     Route:  Subcutaneous        08/05/17 0231     Medium Risk of VTE  Once      08/05/17 0231     Place sequential compression device  Until discontinued      08/05/17 0231     Place MISA hose  Until discontinued      08/05/17 0231          Critical care time spent on the evaluation and treatment of severe organ dysfunction, review of pertinent labs and imaging studies, discussions with consulting providers and discussions with patient/family: 45 minutes.    Malina Baird MD  Department of Hospital Medicine   [facility]

## 2017-08-07 VITALS
WEIGHT: 227.75 LBS | BODY MASS INDEX: 37.95 KG/M2 | RESPIRATION RATE: 24 BRPM | HEIGHT: 65 IN | TEMPERATURE: 99 F | HEART RATE: 75 BPM | DIASTOLIC BLOOD PRESSURE: 69 MMHG | OXYGEN SATURATION: 97 % | SYSTOLIC BLOOD PRESSURE: 112 MMHG

## 2017-08-07 LAB
ANION GAP SERPL CALC-SCNC: 12 MMOL/L
BASOPHILS # BLD AUTO: 0.03 K/UL
BASOPHILS NFR BLD: 0.3 %
BUN SERPL-MCNC: 16 MG/DL
CALCIUM SERPL-MCNC: 9.5 MG/DL
CHLORIDE SERPL-SCNC: 93 MMOL/L
CO2 SERPL-SCNC: 31 MMOL/L
CREAT SERPL-MCNC: 0.9 MG/DL
DIFFERENTIAL METHOD: ABNORMAL
EOSINOPHIL # BLD AUTO: 0.3 K/UL
EOSINOPHIL NFR BLD: 2.8 %
ERYTHROCYTE [DISTWIDTH] IN BLOOD BY AUTOMATED COUNT: 14.9 %
EST. GFR  (AFRICAN AMERICAN): >60 ML/MIN/1.73 M^2
EST. GFR  (NON AFRICAN AMERICAN): >60 ML/MIN/1.73 M^2
GLUCOSE SERPL-MCNC: 85 MG/DL
HCT VFR BLD AUTO: 42.6 %
HGB BLD-MCNC: 14 G/DL
LYMPHOCYTES # BLD AUTO: 2.1 K/UL
LYMPHOCYTES NFR BLD: 18.9 %
M PNEUMO IGG SER IA-ACNC: 4.01 INDEX
M PNEUMO IGM SER IA-ACNC: 0.25 INDEX
MCH RBC QN AUTO: 28 PG
MCHC RBC AUTO-ENTMCNC: 32.9 G/DL
MCV RBC AUTO: 85 FL
MONOCYTES # BLD AUTO: 0.4 K/UL
MONOCYTES NFR BLD: 3.8 %
NEUTROPHILS # BLD AUTO: 8.2 K/UL
NEUTROPHILS NFR BLD: 74 %
PLATELET # BLD AUTO: 266 K/UL
PMV BLD AUTO: 10.8 FL
POTASSIUM SERPL-SCNC: 3.8 MMOL/L
RBC # BLD AUTO: 5 M/UL
SODIUM SERPL-SCNC: 136 MMOL/L
TROPONIN I SERPL DL<=0.01 NG/ML-MCNC: 0.01 NG/ML
WBC # BLD AUTO: 11.04 K/UL

## 2017-08-07 PROCEDURE — 80048 BASIC METABOLIC PNL TOTAL CA: CPT

## 2017-08-07 PROCEDURE — 27000221 HC OXYGEN, UP TO 24 HOURS

## 2017-08-07 PROCEDURE — 94640 AIRWAY INHALATION TREATMENT: CPT

## 2017-08-07 PROCEDURE — 63600175 PHARM REV CODE 636 W HCPCS: Performed by: INTERNAL MEDICINE

## 2017-08-07 PROCEDURE — 25000003 PHARM REV CODE 250: Performed by: HOSPITALIST

## 2017-08-07 PROCEDURE — A4216 STERILE WATER/SALINE, 10 ML: HCPCS | Performed by: EMERGENCY MEDICINE

## 2017-08-07 PROCEDURE — 25000003 PHARM REV CODE 250: Performed by: EMERGENCY MEDICINE

## 2017-08-07 PROCEDURE — 25000242 PHARM REV CODE 250 ALT 637 W/ HCPCS: Performed by: HOSPITALIST

## 2017-08-07 PROCEDURE — 84484 ASSAY OF TROPONIN QUANT: CPT

## 2017-08-07 PROCEDURE — 36415 COLL VENOUS BLD VENIPUNCTURE: CPT

## 2017-08-07 PROCEDURE — 25000003 PHARM REV CODE 250: Performed by: INTERNAL MEDICINE

## 2017-08-07 PROCEDURE — 85025 COMPLETE CBC W/AUTO DIFF WBC: CPT

## 2017-08-07 RX ADMIN — LEVOTHYROXINE SODIUM 150 MCG: 150 TABLET ORAL at 05:08

## 2017-08-07 RX ADMIN — Medication 3 ML: at 06:08

## 2017-08-07 RX ADMIN — OXYCODONE HYDROCHLORIDE 5 MG: 5 TABLET ORAL at 08:08

## 2017-08-07 RX ADMIN — IPRATROPIUM BROMIDE AND ALBUTEROL SULFATE 3 ML: .5; 3 SOLUTION RESPIRATORY (INHALATION) at 12:08

## 2017-08-07 RX ADMIN — DOCUSATE SODIUM 100 MG: 100 CAPSULE, LIQUID FILLED ORAL at 08:08

## 2017-08-07 RX ADMIN — IPRATROPIUM BROMIDE AND ALBUTEROL SULFATE 3 ML: .5; 3 SOLUTION RESPIRATORY (INHALATION) at 07:08

## 2017-08-07 RX ADMIN — FUROSEMIDE 60 MG: 10 INJECTION, SOLUTION INTRAMUSCULAR; INTRAVENOUS at 08:08

## 2017-08-07 RX ADMIN — IPRATROPIUM BROMIDE AND ALBUTEROL SULFATE 3 ML: .5; 3 SOLUTION RESPIRATORY (INHALATION) at 01:08

## 2017-08-07 RX ADMIN — GUAIFENESIN AND DEXTROMETHORPHAN 10 ML: 100; 10 SYRUP ORAL at 08:08

## 2017-08-07 NOTE — PLAN OF CARE
08/06/17 1538   Discharge Assessment   Assessment Type Discharge Planning Assessment   Confirmed/corrected address and phone number on facesheet? Yes   Communicated expected length of stay with patient/caregiver yes   If Healthcare Directive is received, is it scanned into Epic? no (comment)   Prior to hospitilization cognitive status: Alert/Oriented;No Deficits   Prior to hospitalization functional status: Independent   Current cognitive status: Alert/Oriented;No Deficits   Current Functional Status: Independent   Arrived From admitted as an inpatient   Lives With child(orsendo), dependent;parent(s)   Able to Return to Prior Arrangements yes   Is patient able to care for self after discharge? Yes   How many people do you have in your home that can help with your care after discharge? 2   Who are your caregiver(s) and their phone number(s)? ([phone] Vogel [facility] [phone])   Patient's perception of discharge disposition admitted as an inpatient   Readmission Within The Last 30 Days no previous admission in last 30 days   Patient currently being followed by outpatient case management? No   Patient currently receives home health services? No   Does the patient currently use HME? No   Patient currently receives private duty nursing? No   Patient currently receives any other outside agency services? No   Equipment Currently Used at Home none   Do you have any problems affording any of your prescribed medications? No   Is the patient taking medications as prescribed? yes   Do you have any financial concerns preventing you from receiving the healthcare you need? No   Does the patient have transportation to healthcare appointments? Yes   Transportation Available car;family or friend will provide   On Dialysis? No   Does the patient receive services at the Coumadin Clinic? No   Are there any open cases? No   Discharge Plan A Home with family   Discharge Plan B Home with family   Patient/Family In Agreement With Plan yes      [facility] - [facility] [place] - [address]  [address]  [place] [address]  Phone: [phone] Fax: [phone]

## 2017-08-07 NOTE — DISCHARGE SUMMARY
[facility] Hospital Medicine  Discharge Summary      Patient Name: Beth Cassidy  MRN: [id number]  Admission Date: 8/4/2017  Hospital Length of Stay: 2 days  Discharge Date and Time: 8/7/2017  3:35 PM  Attending Physician: No att. providers found   Discharging Provider: Malina Baird MD  Primary Care Provider: Mati Fabian MD      HPI:   Ms. Cassidy is a 42 y.o with discoid lupus, chronic prescription opiate use, anxiety, depression, neuropathy, and hypothyroidism who presented 8/4/2017 for shortness of breath. Please see H&P for full detail.      Hospital Course:   Ms. Cassidy was admitted for acute hypoxic respiratory failure with pulmonary edema and likely pneumonia. CTA chest negative for PE. Echo unremarkable. She was slowly weaned off BiPAP but still had difficulty maintaining O2 sats. Afebrile, leukocytosis stable (on prednisone). Continue azithromycin and ceftriaxone for CAP. Continued aggressive diuresis. BNP improved. Repeat CXR 8/6/2017 without much improvement. She continued to require NC to maintain sats in the low 90s. She was insistent that she be discharged so she could make her court date on Wednesday. I explained that her respiratory status is still very poor and we cannot recommend discharge if she is still so severely hypoxic. She understood the risk of death if she were to leave the hospital at this time. She had capacity and signed herself out AMA.  Of note, there were some odd behaviors such as her somnolence without clear reason the day following admission. She has extensive record in the  for Soma, Fentanyl, etc. She was also noted to have very difficult to maintain venous access and nursing commented that she was very protective of her bag. I'm concerned for opiate abuse/addiciton and surreptitious use whether it be prescription, IV, or both.       Consults         Status Ordering Provider     Inpatient consult to Pulmonology  Once     Provider:  Priscilla  APRIL Lam MD    Completed MILAGROS DOTY          Pending Diagnostic Studies:     Procedure Component Value Units Date/Time    Drugs of Abuse Screen, Blood [[id number]] Collected:  08/05/17 1143    Order Status:  Sent Lab Status:  In process Updated:  08/05/17 1148    Specimen:  Blood from Blood     Legionella antigen, urine [[id number]] Collected:  08/05/17 0745    Order Status:  Sent Lab Status:  In process Updated:  08/05/17 0804    Specimen:  Urine from Urine, Clean Catch     Legionella pneumophila antibodies, total [[id number]] Collected:  08/05/17 0556    Order Status:  Sent Lab Status:  In process Updated:  08/05/17 0557    Specimen:  Blood from Blood     Mycoplasma pneumoniae Ab IgG & IgM [[id number]] Collected:  08/05/17 0556    Order Status:  Sent Lab Status:  In process Updated:  08/05/17 0557    Specimen:  Blood from Blood         Final Active Diagnoses:    Diagnosis Date Noted POA    PRINCIPAL PROBLEM:  Acute hypoxemic respiratory failure [J96.01] 08/05/2017 Yes    Acute pulmonary edema [J81.0] 08/05/2017 Yes    Neutrophilic leukocytosis [D72.9] 08/05/2017 Yes    Elevated troponin [R74.8] 08/05/2017 Yes    Hypokalemia [E87.6] 08/05/2017 Yes    Discoid lupus [L93.0] 09/05/2012 Yes     Chronic    Hypothyroidism [E03.9] 09/05/2012 Yes     Chronic    Pain [R52] 09/05/2012 Yes     Chronic      Problems Resolved During this Admission:    Diagnosis Date Noted Date Resolved POA        Discharged Condition: against medical advice    Disposition: Left Against Medical Adv*    Follow Up:    Patient Instructions:   No discharge procedures on file.  Medications:  Reconciled Home Medications:   Discharge Medication List as of 8/7/2017  3:35 PM      CONTINUE these medications which have NOT CHANGED    Details   albuterol (ACCUNEB) 0.63 mg/3 mL Nebu Take 0.63 mg by nebulization every 6 (six) hours as needed. Rescue, Historical Med      alprazolam (XANAX) 0.25 MG tablet Take 0.25 mg by mouth 3 (three) times  daily., Historical Med      gabapentin (NEURONTIN) 100 MG capsule Take 100 mg by mouth 3 (three) times daily., Historical Med      levothyroxine (SYNTHROID) 150 MCG tablet Take 150 mcg by mouth once daily.  , Until Discontinued, Historical Med      oxycodone (ROXICODONE) 15 MG Tab Take 10 mg by mouth every 4 (four) hours as needed for Pain., Historical Med      mupirocin (BACTROBAN) 2 % ointment Apply topically 3 (three) times daily.  , Until Discontinued, Historical Med      tizanidine (ZANAFLEX) 2 mg capsule Take 1 capsule (2 mg total) by mouth 3 (three) times daily., Starting 9/10/2012, Until Discontinued, Print      trazodone (DESYREL) 100 MG tablet Take 100 mg by mouth every evening.  , Until Discontinued, Historical Med           Time spent on the discharge of patient: 75 minutes    HOS POC IP DISCHARGE SUMMARY    Malina Baird MD  Department of Hospital Medicine  [facility]

## 2017-08-07 NOTE — PLAN OF CARE
Problem: Patient Care Overview  Goal: Plan of Care Review  Outcome: Ongoing (interventions implemented as appropriate)  Patient remains on venti mask 55%; tolerating well. Placed on BIPAP while sleeping. Remains AAO x 4. VSS. Afebrile. NSR. IV antibiotics administered per order. Voiding per bedpan. Remains free from falls and skin breakdown this shift.

## 2017-08-07 NOTE — NURSING
1400: Pt informs nurse she is leaving today because she has court coming up and has many things to do at home. Nurse attempts to educate patient on danger of leaving against medical device. Pt verbalized understanding. Pt left AMA.

## 2017-08-07 NOTE — PROGRESS NOTES
[facility]  Pulmonology  Progress Note    Patient Name: Beth Cassidy  MRN: [id number]  Admission Date: 8/4/2017  Hospital Length of Stay: 2 days  Code Status: Full Code  Attending Provider: Malina Baird MD  Primary Care Provider: Mati Fabian MD   Principal Problem: Acute hypoxemic respiratory failure    Subjective:dyspnea     Interval History: Feels better today. On VFM. Off bipap. She states she has not had fu for lupus in over 5 years - stopped going to [facility] as they to not accept medicaid per patient.    Objective:     Vital Signs (Most Recent):  Temp: 97.9 °F (36.6 °C) (08/07/17 0800)  Pulse: 75 (08/07/17 1035)  Resp: (!) 35 (08/07/17 1035)  BP: 131/62 (08/07/17 1007)  SpO2: (!) 94 % (08/07/17 1035) Vital Signs (24h Range):  Temp:  [97.8 °F (36.6 °C)-99 °F (37.2 °C)] 97.9 °F (36.6 °C)  Pulse:  [67-97] 75  Resp:  [15-49] 35  SpO2:  [90 %-100 %] 94 %  BP: (111-164)/() 131/62     Weight: 103.3 kg (227 lb 11.8 oz)  Body mass index is 37.9 kg/m².      Intake/Output Summary (Last 24 hours) at 08/07/17 1055  Last data filed at 08/07/17 0900   Gross per 24 hour   Intake              610 ml   Output             1450 ml   Net             -840 ml       Physical Exam   Constitutional: She appears well-developed and well-nourished. No distress.   obese   HENT:   Head: Normocephalic and atraumatic.   Neck: Neck supple.   Cardiovascular: Normal rate and regular rhythm.    No murmur heard.  Pulmonary/Chest: She has rales.   Weak, shallow efforts and mildly dyspneic   Abdominal: Soft. Bowel sounds are normal. There is no tenderness.   Musculoskeletal: She exhibits no edema or deformity.   Neurological: She is alert.   Skin: Skin is warm and dry.   Psychiatric: Her behavior is normal.       Vents:  Oxygen Concentration (%): 40 (08/07/17 0955)    Lines/Drains/Airways     Peripheral Intravenous Line                 Peripheral IV - Single Lumen 08/06/17 1100 Right Upper Arm less than  1 day         Peripheral IV - Single Lumen 08/07/17 0130 Left Forearm less than 1 day                Significant Labs:    CBC/Anemia Profile:    Recent Labs  Lab 08/06/17  0146 08/06/17  0342 08/07/17  0408   WBC  --  17.47* 11.04   HGB  --  13.8 14.0   HCT 42 42.0 42.6   PLT  --  255 266   MCV  --  86 85   RDW  --  15.4* 14.9*        Chemistries:    Recent Labs  Lab 08/06/17  0342 08/07/17  0408    136   K 3.6 3.8   CL 92* 93*   CO2 31* 31*   BUN 13 16   CREATININE 1.0 0.9   CALCIUM 9.3 9.5   MG 2.1  --    PHOS 2.1*  --        Recent Labs  Lab 08/06/17  1407   PH 7.495*   PCO2 44.0   PO2 49*   HCO3 33.9*   POCSATURATED 87*   BE 9       Significant Imaging:    No apparent change in cardiopulmonary status. Persistent diffuse patchy airspace disease throughout both lungs, without new focal consolidation no new pleural fluid or pneumothorax. Surgical clips level of the neck.    Echo 8/5/17    1 - TDS.     2 - Normal left ventricular systolic function (EF 55-60%).     3 - No diagnostic regional wall motion abnormalities.     4 - Concentric remodeling.     5 - Mild aortic regurgitation.     Assessment/Plan:     Active Diagnoses:    Diagnosis Date Noted POA    PRINCIPAL PROBLEM:  Acute hypoxemic respiratory failure [J96.01] 08/05/2017 Yes    Acute pulmonary edema [J81.0] 08/05/2017 Yes    Neutrophilic leukocytosis [D72.9] 08/05/2017 Yes    Hypokalemia [E87.6] 08/05/2017 Yes    Elevated troponin [R74.8] 08/05/2017 Yes    Discoid lupus [L93.0] 09/05/2012 Yes     Chronic    Hypothyroidism [E03.9] 09/05/2012 Yes     Chronic    Pain [R52] 09/05/2012 Yes     Chronic      Problems Resolved During this Admission:    Diagnosis Date Noted Date Resolved POA       1) Hypoxic respiratory failure - in 42 year old with reported lupus. Abnormal CXR and CTA. Presented with acute onset dyspnea and hypoxia. ABG as above. Is a bit better today. CXR improved yesterday. Negative balance. Will try repeat cxr with diruesis.  Continue empiric abx. WBC is elevated but stable. Afebrile. She does smoke. HIV rapid is negative. Atypical infection, CHF are possibilites. However, bnp was normal and echo as above. She was asking for pain meds. Tox screen is still pending. Bronchodilators. Sputum culture pending. Continue bipap. IS to bedside. Repeat cxr in am. Diuresis.  2) Reported history of lupus. Is not on any chronic immunosuppressives as outpatient. CRP elevated. ESR mildly elevated. Has not had fu in years. Will need fuw ith rheumatology.  3) Hypothyroidism  4) History of anxiety/depression    Agree with above ; she has taken intermittent steroids in past yrs ; will consider restarting if Aa gradient not improving     Lino Wade MD  Pulmonology  [facility]

## 2017-08-08 LAB
AMPHETAMINES SERPL QL: NEGATIVE
BARBITURATES SERPL QL SCN: NEGATIVE
BENZODIAZ SERPL QL: NEGATIVE
CANNABINOIDS SERPL QL: NEGATIVE
COCAINE, BLOOD: NEGATIVE
ETHANOL SERPL-MCNC: NEGATIVE MG/DL
L PNEUMO AB SER QL: NEGATIVE
METHADONE SERPL QL SCN: NEGATIVE
OPIATES SERPL QL SCN: NEGATIVE
PCP SERPL QL SCN: NEGATIVE
PROPOXYPH SERPL QL: NEGATIVE

## 2017-08-09 LAB — L PNEUMO AG UR QL IA: NOT DETECTED

## 2017-11-06 PROBLEM — J81.0 ACUTE PULMONARY EDEMA: Status: RESOLVED | Noted: 2017-08-05 | Resolved: 2017-11-06

## 2018-05-19 NOTE — PLAN OF CARE
08/07/17 1533   Final Note   Assessment Type Final Discharge Note   Discharge Disposition Left Against      Speaking Coherently

## 2019-02-23 ENCOUNTER — HOSPITAL ENCOUNTER (EMERGENCY)
Facility: HOSPITAL | Age: 44
Discharge: HOME | End: 2019-02-23
Attending: EMERGENCY MEDICINE
Payer: MEDICAID

## 2019-02-23 VITALS
SYSTOLIC BLOOD PRESSURE: 137 MMHG | WEIGHT: 228 LBS | BODY MASS INDEX: 36.64 KG/M2 | OXYGEN SATURATION: 100 % | HEIGHT: 66 IN | HEART RATE: 54 BPM | TEMPERATURE: 98 F | RESPIRATION RATE: 16 BRPM | DIASTOLIC BLOOD PRESSURE: 63 MMHG

## 2019-02-23 DIAGNOSIS — R41.82 ALTERED MENTAL STATUS: Primary | ICD-10-CM

## 2019-02-23 DIAGNOSIS — T40.601A OPIATE OVERDOSE, ACCIDENTAL OR UNINTENTIONAL, INITIAL ENCOUNTER: ICD-10-CM

## 2019-02-23 DIAGNOSIS — T42.4X1A BENZODIAZEPINE OVERDOSE, ACCIDENTAL OR UNINTENTIONAL, INITIAL ENCOUNTER: ICD-10-CM

## 2019-02-23 LAB
ALBUMIN SERPL BCP-MCNC: 3.7 G/DL
ALP SERPL-CCNC: 175 U/L
ALT SERPL W/O P-5'-P-CCNC: 8 U/L
AMPHET+METHAMPHET UR QL: NEGATIVE
ANION GAP SERPL CALC-SCNC: 9 MMOL/L
AST SERPL-CCNC: 23 U/L
B-HCG UR QL: NEGATIVE
BARBITURATES UR QL SCN>200 NG/ML: NEGATIVE
BASOPHILS # BLD AUTO: 0.04 K/UL
BASOPHILS NFR BLD: 0.5 %
BENZODIAZ UR QL SCN>200 NG/ML: NORMAL
BILIRUB SERPL-MCNC: 0.7 MG/DL
BILIRUB UR QL STRIP: NEGATIVE
BUN SERPL-MCNC: 8 MG/DL
BZE UR QL SCN: NEGATIVE
CALCIUM SERPL-MCNC: 9.4 MG/DL
CANNABINOIDS UR QL SCN: NEGATIVE
CHLORIDE SERPL-SCNC: 99 MMOL/L
CLARITY UR: CLEAR
CO2 SERPL-SCNC: 29 MMOL/L
COLOR UR: YELLOW
CREAT SERPL-MCNC: 1.1 MG/DL
CREAT UR-MCNC: 145.2 MG/DL
DIFFERENTIAL METHOD: ABNORMAL
EOSINOPHIL # BLD AUTO: 0.2 K/UL
EOSINOPHIL NFR BLD: 2.6 %
ERYTHROCYTE [DISTWIDTH] IN BLOOD BY AUTOMATED COUNT: 15 %
EST. GFR  (AFRICAN AMERICAN): >60 ML/MIN/1.73 M^2
EST. GFR  (NON AFRICAN AMERICAN): >60 ML/MIN/1.73 M^2
ETHANOL SERPL-MCNC: <10 MG/DL
GLUCOSE SERPL-MCNC: 78 MG/DL
GLUCOSE UR QL STRIP: NEGATIVE
HCT VFR BLD AUTO: 44.5 %
HGB BLD-MCNC: 14.1 G/DL
HGB UR QL STRIP: NEGATIVE
KETONES UR QL STRIP: NEGATIVE
LEUKOCYTE ESTERASE UR QL STRIP: NEGATIVE
LYMPHOCYTES # BLD AUTO: 2.1 K/UL
LYMPHOCYTES NFR BLD: 24.7 %
MCH RBC QN AUTO: 28.1 PG
MCHC RBC AUTO-ENTMCNC: 31.7 G/DL
MCV RBC AUTO: 89 FL
METHADONE UR QL SCN>300 NG/ML: NEGATIVE
MONOCYTES # BLD AUTO: 0.6 K/UL
MONOCYTES NFR BLD: 7.5 %
NEUTROPHILS # BLD AUTO: 5.4 K/UL
NEUTROPHILS NFR BLD: 64.7 %
NITRITE UR QL STRIP: NEGATIVE
OPIATES UR QL SCN: NORMAL
PCP UR QL SCN>25 NG/ML: NEGATIVE
PH UR STRIP: 5 [PH] (ref 5–8)
PLATELET # BLD AUTO: 295 K/UL
PMV BLD AUTO: 10.3 FL
POCT GLUCOSE: 85 MG/DL (ref 70–110)
POTASSIUM SERPL-SCNC: 3.9 MMOL/L
PROT SERPL-MCNC: 7.9 G/DL
PROT UR QL STRIP: NEGATIVE
RBC # BLD AUTO: 5.01 M/UL
SODIUM SERPL-SCNC: 137 MMOL/L
SP GR UR STRIP: 1.01 (ref 1–1.03)
T4 FREE SERPL-MCNC: 0.95 NG/DL
TOXICOLOGY INFORMATION: NORMAL
TSH SERPL DL<=0.005 MIU/L-ACNC: 8.6 UIU/ML
URN SPEC COLLECT METH UR: ABNORMAL
UROBILINOGEN UR STRIP-ACNC: ABNORMAL EU/DL
WBC # BLD AUTO: 8.41 K/UL

## 2019-02-23 PROCEDURE — 84439 ASSAY OF FREE THYROXINE: CPT

## 2019-02-23 PROCEDURE — 80307 DRUG TEST PRSMV CHEM ANLYZR: CPT

## 2019-02-23 PROCEDURE — 84443 ASSAY THYROID STIM HORMONE: CPT

## 2019-02-23 PROCEDURE — 99285 EMERGENCY DEPT VISIT HI MDM: CPT | Mod: 25

## 2019-02-23 PROCEDURE — 85025 COMPLETE CBC W/AUTO DIFF WBC: CPT

## 2019-02-23 PROCEDURE — 81025 URINE PREGNANCY TEST: CPT

## 2019-02-23 PROCEDURE — 80320 DRUG SCREEN QUANTALCOHOLS: CPT

## 2019-02-23 PROCEDURE — 82962 GLUCOSE BLOOD TEST: CPT

## 2019-02-23 PROCEDURE — 80053 COMPREHEN METABOLIC PANEL: CPT

## 2019-02-23 PROCEDURE — 81003 URINALYSIS AUTO W/O SCOPE: CPT | Mod: 59

## 2019-02-23 RX ORDER — NALOXONE HYDROCHLORIDE 4 MG/.1ML
SPRAY NASAL
Qty: 1 EACH | Refills: 0 | Status: SHIPPED | OUTPATIENT
Start: 2019-02-23 | End: 2019-02-23 | Stop reason: SDUPTHER

## 2019-02-23 RX ORDER — NALOXONE HYDROCHLORIDE 4 MG/.1ML
SPRAY NASAL
Qty: 1 EACH | Refills: 0 | Status: SHIPPED | OUTPATIENT
Start: 2019-02-23 | End: 2021-07-02 | Stop reason: SDUPTHER

## 2019-02-23 NOTE — ED TRIAGE NOTES
Arrived via EMS with possible overdose on oxycodone. Pt was found unconscious at [facility].  Pt was given 0.5mg Narcan by EMT and is somnolent at bedside. Pt is either unable or unwilling to answer any questions. Pt yells out and pushes the nurse away during  sternal rubs.

## 2019-02-23 NOTE — ED PROVIDER NOTES
"Encounter Date: 2019    SCRIBE #1 NOTE: I, Andre Quinn, am scribing for, and in the presence of,  Adebayo Dudley MD. I have scribed the following portions of the note - Other sections scribed: HPI, ROS, PE.       History     Chief Complaint   Patient presents with    Drug Overdose     possible opiate overdose; pt was found down at drug store; given 0.5 narcan IV; pt is somnolent but oriented at this time     CC: Drug Overdose    HPI  The patient is a 44 y/o female with pmhx of anciety, dperession, discoid lupus, graves disease, and hypothyroid that presents via EMS for emergent evaluation of a drug overdose. EMS was called to a [employer] after the pt was falling over at the checkout register. Upon arrival she was not very responsive, had low O2 sats, and pinpoint pupils. A "friend" of the pt stated that the pt takes oxycodone. EMS administered 0.5 narcan IV prior to arrival. After sternal rub, pt states she only took x1 oxycodone today. HPI and ROS limited due to condition of the patient.       The history is provided by the patient and the EMS personnel. The history is limited by the condition of the patient. No  was used.     Review of patient's allergies indicates:   Allergen Reactions    Morphine Other (See Comments)     "throat closes up"     Past Medical History:   Diagnosis Date    Anxiety     Depression     Discoid lupus     Graves disease     Hypothyroid     Neuropathy due to systemic lupus erythematosus     Systemic lupus erythematosus arthritis      Past Surgical History:   Procedure Laterality Date     SECTION      THYROIDECTOMY  2004    TUBAL LIGATION       Family History   Problem Relation Age of Onset    No Known Problems Mother     No Known Problems Father      Social History     Tobacco Use    Smoking status: Current Every Day Smoker     Packs/day: 0.50     Years: 0.50     Pack years: 0.25     Types: Cigarettes   Substance Use Topics    " Alcohol use: No    Drug use: No     Review of Systems   Unable to perform ROS: Acuity of condition       Physical Exam     Initial Vitals [02/23/19 0346]   BP Pulse Resp Temp SpO2   108/62 85 18 98 °F (36.7 °C) 98 %      MAP       --         Physical Exam    Nursing note and vitals reviewed.  Constitutional: She appears well-developed and well-nourished.   Slurring words    HENT:   Head: Normocephalic and atraumatic.   Right Ear: External ear normal.   Left Ear: External ear normal.   Mouth/Throat: Oropharynx is clear and moist.   Eyes: EOM are normal. Pupils are equal, round, and reactive to light.   Neck: Normal range of motion.   Cardiovascular: Normal rate and regular rhythm.   Pulmonary/Chest: Breath sounds normal. No stridor. No respiratory distress. She has no wheezes.   Abdominal: Soft. Bowel sounds are normal.   Musculoskeletal: Normal range of motion. She exhibits no edema or tenderness.   Neurological: She is alert.   Seems to be on some type of depressant and slurring words and somnolent   Skin: Skin is warm and dry. Capillary refill takes less than 2 seconds.   Psychiatric: She has a normal mood and affect. Thought content normal.         ED Course   Procedures  Labs Reviewed   CBC W/ AUTO DIFFERENTIAL - Abnormal; Notable for the following components:       Result Value    MCHC 31.7 (*)     RDW 15.0 (*)     All other components within normal limits   COMPREHENSIVE METABOLIC PANEL - Abnormal; Notable for the following components:    Alkaline Phosphatase 175 (*)     ALT 8 (*)     All other components within normal limits   URINALYSIS, REFLEX TO URINE CULTURE - Abnormal; Notable for the following components:    Urobilinogen, UA 2.0-3.0 (*)     All other components within normal limits    Narrative:     Preferred Collection Type->Urine, Clean Catch   TSH - Abnormal; Notable for the following components:    TSH 8.595 (*)     All other components within normal limits   ALCOHOL,MEDICAL (ETHANOL)   DRUG SCREEN  PANEL, URINE EMERGENCY    Narrative:     Preferred Collection Type->Urine, Clean Catch   T4, FREE   PREGNANCY TEST, URINE RAPID   POCT GLUCOSE   POCT GLUCOSE MONITORING CONTINUOUS          Imaging Results          CT Head Without Contrast (Final result)  Result time 02/23/19 10:24:44    Final result by Sav Reyez MD (02/23/19 10:24:44)                 Impression:      No acute abnormality.      Electronically signed by: Sav Reyez MD  Date:    02/23/2019  Time:    10:24             Narrative:    EXAMINATION:  CT HEAD WITHOUT CONTRAST    CLINICAL HISTORY:  Confusion/delirium, altered LOC, unexplained;    TECHNIQUE:  Low dose axial CT images obtained throughout the head without intravenous contrast. Sagittal and coronal reconstructions were performed.    COMPARISON:  None.    FINDINGS:  Intracranial compartment:    Ventricles and sulci are normal in size for age without evidence of hydrocephalus. No extra-axial blood or fluid collections.    The brain parenchyma appears normal. No parenchymal mass, hemorrhage, edema or major vascular distribution infarct.    Skull/extracranial contents (limited evaluation): No fracture. Mastoid air cells and paranasal sinuses are essentially clear.                                 Medical Decision Making:   Initial Assessment:   6:30 AM  Patient signed out to me by Dr. Dudley.  For him on arrival patient came in with pinpoint pupils and slurring words and seemed to be on some type of the present.  She also could be a on alcohol.  If he had sent off labs.  No obvious trauma on his exam.  Pending labs and reassessment on the patient.    Labs are notable for benzodiazepines and opiates.  Patient admits to taking Percocet and Xanax last night.  Alcohol is reassuring.  The rest her labs showed nothing acute.  Patient remembers being really tired in the store last night and then wanting to go to sleep.  Then remembers waking appear.  Admits to taking her pain medications and  Xanax before going to the store.  Denies any suicidal or homicidal ideation.  Is answering questions for me.  Family member to come pick patient up.  No obvious signs of trauma. Instructed patient please refrain from combining opiates and benzodiazepines due to the sedative properties that they will have.  Ambulatory and tolerating p.o. I discussed with the patient the diagnosis, treatment plan, indications for return to the emergency department, and for expected follow-up. The patient verbalized an understanding. The patient is asked if there are any questions or concerns. We discuss the case, until all issues are addressed to the patient's satisfaction. Patient understands and is agreeable to the plan.   Jordan Hu    Clinical Tests:   Lab Tests: Reviewed and Ordered  Radiological Study: Ordered and Reviewed            Scribe Attestation:   Scribe #1: I performed the above scribed service and the documentation accurately describes the services I performed. I attest to the accuracy of the note.    Attending Attestation:           Physician Attestation for Scribe:  Physician Attestation Statement for Scribe #1: I, Adebayo Dudley MD, reviewed documentation, as scribed by Andre Quinn in my presence, and it is both accurate and complete.                    Clinical Impression:       ICD-10-CM ICD-9-CM   1. Altered mental status R41.82 780.97   2. Benzodiazepine overdose, accidental or unintentional, initial encounter T42.4X1A 969.4     E853.2   3. Opiate overdose, accidental or unintentional, initial encounter T40.601A 965.00     E850.2                                Jordan Hu MD  02/23/19 5092

## 2019-02-23 NOTE — DISCHARGE INSTRUCTIONS
You were seen in the emergency department for altered mental status.  Your exam is reassuring.  You stated you took some pain medication and benzodiazepines.  You became more alert over time.  We think you're safe to go home.  Please be very careful when taking these medications as they can make you very sleepy.  Please return for any new or worsening pain, nausea, vomiting, dizziness, or you become concerned in any other way.

## 2021-05-25 ENCOUNTER — HOSPITAL ENCOUNTER (EMERGENCY)
Facility: HOSPITAL | Age: 46
Discharge: HOME | End: 2021-05-26
Attending: EMERGENCY MEDICINE
Payer: MEDICAID

## 2021-05-25 DIAGNOSIS — R11.2 NON-INTRACTABLE VOMITING WITH NAUSEA, UNSPECIFIED VOMITING TYPE: Primary | ICD-10-CM

## 2021-05-25 DIAGNOSIS — R10.13 EPIGASTRIC PAIN: ICD-10-CM

## 2021-05-25 LAB
ALBUMIN SERPL BCP-MCNC: 5 G/DL (ref 3.5–5.2)
ALP SERPL-CCNC: 99 U/L (ref 55–135)
ALT SERPL W/O P-5'-P-CCNC: 14 U/L (ref 10–44)
ANION GAP SERPL CALC-SCNC: 16 MMOL/L (ref 8–16)
ANION GAP SERPL CALC-SCNC: 21 MMOL/L (ref 8–16)
AST SERPL-CCNC: 58 U/L (ref 10–40)
BASOPHILS # BLD AUTO: 0.04 K/UL (ref 0–0.2)
BASOPHILS NFR BLD: 0.4 % (ref 0–1.9)
BILIRUB SERPL-MCNC: 1.7 MG/DL (ref 0.1–1)
BUN SERPL-MCNC: 20 MG/DL (ref 6–30)
BUN SERPL-MCNC: 23 MG/DL (ref 6–20)
CALCIUM SERPL-MCNC: 10.5 MG/DL (ref 8.7–10.5)
CHLORIDE SERPL-SCNC: 107 MMOL/L (ref 95–110)
CHLORIDE SERPL-SCNC: 94 MMOL/L (ref 95–110)
CO2 SERPL-SCNC: 22 MMOL/L (ref 23–29)
CREAT SERPL-MCNC: 1.3 MG/DL (ref 0.5–1.4)
CREAT SERPL-MCNC: 1.9 MG/DL (ref 0.5–1.4)
DIFFERENTIAL METHOD: ABNORMAL
EOSINOPHIL # BLD AUTO: 0 K/UL (ref 0–0.5)
EOSINOPHIL NFR BLD: 0.2 % (ref 0–8)
ERYTHROCYTE [DISTWIDTH] IN BLOOD BY AUTOMATED COUNT: 15.1 % (ref 11.5–14.5)
EST. GFR  (AFRICAN AMERICAN): 35 ML/MIN/1.73 M^2
EST. GFR  (NON AFRICAN AMERICAN): 31 ML/MIN/1.73 M^2
GLUCOSE SERPL-MCNC: 59 MG/DL (ref 70–110)
GLUCOSE SERPL-MCNC: 65 MG/DL (ref 70–110)
HCT VFR BLD AUTO: 44.3 % (ref 37–48.5)
HCT VFR BLD CALC: 37 %PCV (ref 36–54)
HGB BLD-MCNC: 15.1 G/DL (ref 12–16)
IMM GRANULOCYTES # BLD AUTO: 0.04 K/UL (ref 0–0.04)
IMM GRANULOCYTES NFR BLD AUTO: 0.4 % (ref 0–0.5)
LIPASE SERPL-CCNC: 17 U/L (ref 4–60)
LYMPHOCYTES # BLD AUTO: 1.5 K/UL (ref 1–4.8)
LYMPHOCYTES NFR BLD: 15.9 % (ref 18–48)
MAGNESIUM SERPL-MCNC: 2.6 MG/DL (ref 1.6–2.6)
MCH RBC QN AUTO: 28.2 PG (ref 27–31)
MCHC RBC AUTO-ENTMCNC: 34.1 G/DL (ref 32–36)
MCV RBC AUTO: 83 FL (ref 82–98)
MONOCYTES # BLD AUTO: 0.4 K/UL (ref 0.3–1)
MONOCYTES NFR BLD: 3.7 % (ref 4–15)
NEUTROPHILS # BLD AUTO: 7.6 K/UL (ref 1.8–7.7)
NEUTROPHILS NFR BLD: 79.4 % (ref 38–73)
NRBC BLD-RTO: 0 /100 WBC
PLATELET # BLD AUTO: 276 K/UL (ref 150–450)
PMV BLD AUTO: 12.2 FL (ref 9.2–12.9)
POC IONIZED CALCIUM: 0.94 MMOL/L (ref 1.06–1.42)
POC TCO2 (MEASURED): 24 MMOL/L (ref 23–29)
POTASSIUM BLD-SCNC: 2.7 MMOL/L (ref 3.5–5.1)
POTASSIUM SERPL-SCNC: 4.8 MMOL/L (ref 3.5–5.1)
PROT SERPL-MCNC: 10.4 G/DL (ref 6–8.4)
RBC # BLD AUTO: 5.35 M/UL (ref 4–5.4)
SAMPLE: ABNORMAL
SODIUM BLD-SCNC: 143 MMOL/L (ref 136–145)
SODIUM SERPL-SCNC: 137 MMOL/L (ref 136–145)
WBC # BLD AUTO: 9.62 K/UL (ref 3.9–12.7)

## 2021-05-25 PROCEDURE — 82565 ASSAY OF CREATININE: CPT

## 2021-05-25 PROCEDURE — 85025 COMPLETE CBC W/AUTO DIFF WBC: CPT | Performed by: EMERGENCY MEDICINE

## 2021-05-25 PROCEDURE — 99900035 HC TECH TIME PER 15 MIN (STAT)

## 2021-05-25 PROCEDURE — 81000 URINALYSIS NONAUTO W/SCOPE: CPT | Performed by: EMERGENCY MEDICINE

## 2021-05-25 PROCEDURE — 85014 HEMATOCRIT: CPT

## 2021-05-25 PROCEDURE — 63600175 PHARM REV CODE 636 W HCPCS: Performed by: EMERGENCY MEDICINE

## 2021-05-25 PROCEDURE — 83735 ASSAY OF MAGNESIUM: CPT | Performed by: EMERGENCY MEDICINE

## 2021-05-25 PROCEDURE — 25000003 PHARM REV CODE 250: Performed by: EMERGENCY MEDICINE

## 2021-05-25 PROCEDURE — 93010 ELECTROCARDIOGRAM REPORT: CPT | Mod: ,,, | Performed by: INTERNAL MEDICINE

## 2021-05-25 PROCEDURE — 93010 EKG 12-LEAD: ICD-10-PCS | Mod: ,,, | Performed by: INTERNAL MEDICINE

## 2021-05-25 PROCEDURE — 96374 THER/PROPH/DIAG INJ IV PUSH: CPT

## 2021-05-25 PROCEDURE — 82330 ASSAY OF CALCIUM: CPT

## 2021-05-25 PROCEDURE — 93005 ELECTROCARDIOGRAM TRACING: CPT

## 2021-05-25 PROCEDURE — 99284 EMERGENCY DEPT VISIT MOD MDM: CPT | Mod: 25

## 2021-05-25 PROCEDURE — 96375 TX/PRO/DX INJ NEW DRUG ADDON: CPT

## 2021-05-25 PROCEDURE — 83690 ASSAY OF LIPASE: CPT | Performed by: EMERGENCY MEDICINE

## 2021-05-25 PROCEDURE — 84295 ASSAY OF SERUM SODIUM: CPT

## 2021-05-25 PROCEDURE — 96361 HYDRATE IV INFUSION ADD-ON: CPT

## 2021-05-25 PROCEDURE — 80053 COMPREHEN METABOLIC PANEL: CPT | Performed by: EMERGENCY MEDICINE

## 2021-05-25 PROCEDURE — 84132 ASSAY OF SERUM POTASSIUM: CPT | Mod: 91

## 2021-05-25 RX ORDER — KETOROLAC TROMETHAMINE 30 MG/ML
15 INJECTION, SOLUTION INTRAMUSCULAR; INTRAVENOUS
Status: COMPLETED | OUTPATIENT
Start: 2021-05-25 | End: 2021-05-25

## 2021-05-25 RX ORDER — ONDANSETRON 2 MG/ML
4 INJECTION INTRAMUSCULAR; INTRAVENOUS
Status: COMPLETED | OUTPATIENT
Start: 2021-05-25 | End: 2021-05-25

## 2021-05-25 RX ADMIN — ONDANSETRON 4 MG: 2 INJECTION INTRAMUSCULAR; INTRAVENOUS at 08:05

## 2021-05-25 RX ADMIN — KETOROLAC TROMETHAMINE 15 MG: 30 INJECTION, SOLUTION INTRAMUSCULAR; INTRAVENOUS at 08:05

## 2021-05-25 RX ADMIN — SODIUM CHLORIDE 1000 ML: 0.9 INJECTION, SOLUTION INTRAVENOUS at 08:05

## 2021-05-26 VITALS
HEART RATE: 56 BPM | BODY MASS INDEX: 36.96 KG/M2 | SYSTOLIC BLOOD PRESSURE: 170 MMHG | HEIGHT: 66 IN | WEIGHT: 230 LBS | RESPIRATION RATE: 16 BRPM | TEMPERATURE: 98 F | DIASTOLIC BLOOD PRESSURE: 77 MMHG | OXYGEN SATURATION: 99 %

## 2021-05-26 LAB
BACTERIA #/AREA URNS HPF: ABNORMAL /HPF
BILIRUB UR QL STRIP: NEGATIVE
CLARITY UR: CLEAR
COLOR UR: YELLOW
GLUCOSE UR QL STRIP: NEGATIVE
HGB UR QL STRIP: ABNORMAL
HYALINE CASTS #/AREA URNS LPF: 2 /LPF
KETONES UR QL STRIP: ABNORMAL
LEUKOCYTE ESTERASE UR QL STRIP: NEGATIVE
MICROSCOPIC COMMENT: ABNORMAL
NITRITE UR QL STRIP: NEGATIVE
PH UR STRIP: 6 [PH] (ref 5–8)
PROT UR QL STRIP: ABNORMAL
RBC #/AREA URNS HPF: 4 /HPF (ref 0–4)
SP GR UR STRIP: 1.03 (ref 1–1.03)
SQUAMOUS #/AREA URNS HPF: 2 /HPF
URN SPEC COLLECT METH UR: ABNORMAL
UROBILINOGEN UR STRIP-ACNC: NEGATIVE EU/DL
WBC #/AREA URNS HPF: 4 /HPF (ref 0–5)

## 2021-05-26 RX ORDER — ONDANSETRON 4 MG/1
4 TABLET, FILM COATED ORAL EVERY 6 HOURS
Qty: 12 TABLET | Refills: 0 | Status: SHIPPED | OUTPATIENT
Start: 2021-05-26

## 2021-05-26 RX ORDER — NAPROXEN 500 MG/1
500 TABLET ORAL 2 TIMES DAILY WITH MEALS
Qty: 14 TABLET | Refills: 0 | Status: SHIPPED | OUTPATIENT
Start: 2021-05-26 | End: 2021-06-02

## 2021-12-16 ENCOUNTER — HOSPITAL ENCOUNTER (EMERGENCY)
Facility: HOSPITAL | Age: 46
Discharge: HOME | End: 2021-12-16
Attending: EMERGENCY MEDICINE
Payer: MEDICAID

## 2021-12-16 VITALS
BODY MASS INDEX: 34.72 KG/M2 | TEMPERATURE: 99 F | HEART RATE: 65 BPM | OXYGEN SATURATION: 100 % | DIASTOLIC BLOOD PRESSURE: 65 MMHG | SYSTOLIC BLOOD PRESSURE: 145 MMHG | HEIGHT: 66 IN | RESPIRATION RATE: 18 BRPM | WEIGHT: 216 LBS

## 2021-12-16 DIAGNOSIS — M25.569 KNEE PAIN: ICD-10-CM

## 2021-12-16 DIAGNOSIS — M79.606 LEG PAIN: ICD-10-CM

## 2021-12-16 DIAGNOSIS — M25.559 HIP PAIN: ICD-10-CM

## 2021-12-16 DIAGNOSIS — M25.519 SHOULDER PAIN: ICD-10-CM
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
## 2021-12-16 DIAGNOSIS — M25.579 ANKLE PAIN: ICD-10-CM

## 2021-12-16 PROCEDURE — 99284 EMERGENCY DEPT VISIT MOD MDM: CPT | Mod: 25,ER

## 2021-12-16 PROCEDURE — 25000003 PHARM REV CODE 250: Mod: ER | Performed by: PHYSICIAN ASSISTANT

## 2021-12-16 RX ORDER — LIDOCAINE 50 MG/G
1 PATCH TOPICAL DAILY
Qty: 15 PATCH | Refills: 0 | Status: SHIPPED | OUTPATIENT
Start: 2021-12-16 | End: 2021-12-31

## 2021-12-16 RX ORDER — CYCLOBENZAPRINE HCL 10 MG
10 TABLET ORAL
Status: DISCONTINUED | OUTPATIENT
Start: 2021-12-16 | End: 2021-12-16

## 2021-12-16 RX ORDER — TIZANIDINE 4 MG/1
4 TABLET ORAL 3 TIMES DAILY PRN
Qty: 30 TABLET | Refills: 0 | Status: SHIPPED | OUTPATIENT
Start: 2021-12-16 | End: 2021-12-26

## 2021-12-16 RX ORDER — ACETAMINOPHEN 500 MG
500 TABLET ORAL EVERY 4 HOURS PRN
Qty: 20 TABLET | Refills: 0 | Status: SHIPPED | OUTPATIENT
Start: 2021-12-16 | End: 2021-12-21

## 2021-12-16 RX ORDER — OXYCODONE AND ACETAMINOPHEN 5; 325 MG/1; MG/1
1 TABLET ORAL
Status: COMPLETED | OUTPATIENT
Start: 2021-12-16 | End: 2021-12-16

## 2021-12-16 RX ADMIN — OXYCODONE HYDROCHLORIDE AND ACETAMINOPHEN 1 TABLET: 5; 325 TABLET ORAL at 03:12

## 2021-12-17 ENCOUNTER — TELEPHONE (OUTPATIENT)
Dept: SPINE | Facility: CLINIC | Age: 46
End: 2021-12-17
Payer: MEDICAID

## 2022-07-28 ENCOUNTER — HOSPITAL ENCOUNTER (EMERGENCY)
Facility: HOSPITAL | Age: 47
Discharge: HOME | End: 2022-07-28
Attending: EMERGENCY MEDICINE
Payer: MEDICAID

## 2022-07-28 VITALS
SYSTOLIC BLOOD PRESSURE: 104 MMHG | WEIGHT: 240 LBS | TEMPERATURE: 99 F | RESPIRATION RATE: 18 BRPM | BODY MASS INDEX: 38.57 KG/M2 | HEART RATE: 75 BPM | OXYGEN SATURATION: 96 % | HEIGHT: 66 IN | DIASTOLIC BLOOD PRESSURE: 79 MMHG

## 2022-07-28 DIAGNOSIS — L97.929 ULCER OF LEFT LOWER EXTREMITY, UNSPECIFIED ULCER STAGE: Primary | ICD-10-CM

## 2022-07-28 PROCEDURE — 99283 EMERGENCY DEPT VISIT LOW MDM: CPT | Mod: ER

## 2022-07-28 RX ORDER — CLINDAMYCIN HYDROCHLORIDE 150 MG/1
150 CAPSULE ORAL 4 TIMES DAILY
Qty: 28 CAPSULE | Refills: 0 | Status: SHIPPED | OUTPATIENT
Start: 2022-07-28 | End: 2022-08-04

## 2022-07-28 NOTE — ED PROVIDER NOTES
"Encounter Date: 2022       History     Chief Complaint   Patient presents with    Leg Pain     C/O BILAT LEG PAIN WHEN WALKING.ALSO C/O LOWER BACK PAIN     This patient has chronic lower extremity edema presents to the emergency department after contacting edge of a chair in her home few days ago.  Patient now has a sore or ulceration present on her left lower extremity..    The history is provided by the patient.     Review of patient's allergies indicates:   Allergen Reactions    Morphine Other (See Comments)     "throat closes up"     Past Medical History:   Diagnosis Date    Anxiety     Depression     Discoid lupus     Graves disease     Hypothyroid     Neuropathy due to systemic lupus erythematosus     Systemic lupus erythematosus arthritis      Past Surgical History:   Procedure Laterality Date     SECTION      THYROIDECTOMY  2004    TUBAL LIGATION       Family History   Problem Relation Age of Onset    No Known Problems Mother     No Known Problems Father      Social History     Tobacco Use    Smoking status: Current Every Day Smoker     Packs/day: 0.50     Years: 0.50     Pack years: 0.25     Types: Cigarettes   Substance Use Topics    Alcohol use: No    Drug use: No     Review of Systems   Constitutional: Negative.    HENT: Negative.    Eyes: Negative.    Respiratory: Negative.    Cardiovascular: Negative.    Gastrointestinal: Negative.    Endocrine: Negative.    Genitourinary: Negative.    Musculoskeletal: Negative.    Skin: Negative.    Allergic/Immunologic: Negative.    Neurological: Negative.    Hematological: Negative.    Psychiatric/Behavioral: Negative.    All other systems reviewed and are negative.      Physical Exam     Initial Vitals   BP Pulse Resp Temp SpO2   22 0144 22 0143 22 0143 22 0143 22 0144   104/79 76 18 98.6 °F (37 °C) 96 %      MAP       --                Physical Exam    Nursing note and vitals " reviewed.  Constitutional: Vital signs are normal. She is Obese . She is active and cooperative.       HENT:   Head: Normocephalic and atraumatic.   Eyes: Conjunctivae, EOM and lids are normal. Pupils are equal, round, and reactive to light.   Neck: Trachea normal. Neck supple. No thyroid mass present.    Full passive range of motion without pain.     Cardiovascular: Normal rate, regular rhythm, S1 normal, S2 normal, normal heart sounds, intact distal pulses and normal pulses.   Pulmonary/Chest: Effort normal and breath sounds normal.   Abdominal: Abdomen is soft. Bowel sounds are normal. There is no abdominal tenderness.   Musculoskeletal:         General: Normal range of motion.      Cervical back: Full passive range of motion without pain and neck supple.        Legs:      Lymphadenopathy:     She has no axillary adenopathy.   Neurological: She is alert and oriented to person, place, and time.   Skin: Skin is warm, dry and intact.   Psychiatric: She has a normal mood and affect. Her speech is normal and behavior is normal. Judgment and thought content normal. Cognition and memory are normal.         ED Course   Procedures  Labs Reviewed - No data to display       Imaging Results    None          Medications - No data to display                       Clinical Impression:   Final diagnoses:  [L97.929] Ulcer of left lower extremity, unspecified ulcer stage (Primary)          ED Disposition Condition    Discharge Stable        ED Prescriptions     Medication Sig Dispense Start Date End Date Auth. Provider    clindamycin (CLEOCIN) 150 MG capsule Take 1 capsule (150 mg total) by mouth 4 (four) times daily. for 7 days 28 capsule 7/28/2022 8/4/2022 Robin Leyva MD        Follow-up Information     Follow up With Specialties Details Why Contact Info    Wound care clinic   They will call you     Bud Paz MD Family Medicine Schedule an appointment as soon as possible for a visit in 3 days  [address] [facility] [place] [address]  [phone]             Robin Leyva MD  07/28/22 0637       Robin Leyva MD  07/28/22 0637

## 2022-08-24 ENCOUNTER — TELEPHONE (OUTPATIENT)
Dept: WOUND CARE | Facility: HOSPITAL | Age: 47
End: 2022-08-24
Payer: MEDICAID

## 2022-09-01 ENCOUNTER — TELEPHONE (OUTPATIENT)
Dept: WOUND CARE | Facility: HOSPITAL | Age: 47
End: 2022-09-01
Payer: MEDICAID

## 2022-09-01 NOTE — TELEPHONE ENCOUNTER
Called at 1018 regarding missed appointment Message  including phone number to call wound care clinic to reschedule.

## 2022-11-09 ENCOUNTER — HOSPITAL ENCOUNTER (EMERGENCY)
Facility: HOSPITAL | Age: 47
Discharge: HOME | End: 2022-11-09
Attending: STUDENT IN AN ORGANIZED HEALTH CARE EDUCATION/TRAINING PROGRAM
Payer: MEDICAID

## 2022-11-09 VITALS
OXYGEN SATURATION: 95 % | SYSTOLIC BLOOD PRESSURE: 190 MMHG | DIASTOLIC BLOOD PRESSURE: 85 MMHG | TEMPERATURE: 98 F | WEIGHT: 250 LBS | HEART RATE: 63 BPM | BODY MASS INDEX: 40.18 KG/M2 | HEIGHT: 66 IN | RESPIRATION RATE: 18 BRPM

## 2022-11-09 DIAGNOSIS — R10.9 ABDOMINAL PAIN: ICD-10-CM

## 2022-11-09 DIAGNOSIS — R11.2 NAUSEA AND VOMITING, UNSPECIFIED VOMITING TYPE: Primary | ICD-10-CM

## 2022-11-09 LAB
ALBUMIN SERPL BCP-MCNC: 4.2 G/DL (ref 3.5–5.2)
ALP SERPL-CCNC: 93 U/L (ref 55–135)
ALT SERPL W/O P-5'-P-CCNC: 7 U/L (ref 10–44)
AMPHET+METHAMPHET UR QL: NEGATIVE
ANION GAP SERPL CALC-SCNC: 19 MMOL/L (ref 8–16)
AST SERPL-CCNC: 31 U/L (ref 10–40)
BARBITURATES UR QL SCN>200 NG/ML: NEGATIVE
BASOPHILS # BLD AUTO: 0.05 K/UL (ref 0–0.2)
BASOPHILS NFR BLD: 0.3 % (ref 0–1.9)
BENZODIAZ UR QL SCN>200 NG/ML: ABNORMAL
BILIRUB SERPL-MCNC: 1 MG/DL (ref 0.1–1)
BILIRUB UR QL STRIP: NEGATIVE
BUN SERPL-MCNC: 18 MG/DL (ref 6–20)
BZE UR QL SCN: NEGATIVE
CALCIUM SERPL-MCNC: 9.5 MG/DL (ref 8.7–10.5)
CANNABINOIDS UR QL SCN: NEGATIVE
CHLORIDE SERPL-SCNC: 101 MMOL/L (ref 95–110)
CLARITY UR: CLEAR
CO2 SERPL-SCNC: 19 MMOL/L (ref 23–29)
COLOR UR: YELLOW
CREAT SERPL-MCNC: 1.7 MG/DL (ref 0.5–1.4)
CREAT UR-MCNC: 145.1 MG/DL (ref 15–325)
CTP QC/QA: YES
CTP QC/QA: YES
DIFFERENTIAL METHOD: ABNORMAL
EOSINOPHIL # BLD AUTO: 0 K/UL (ref 0–0.5)
EOSINOPHIL NFR BLD: 0 % (ref 0–8)
ERYTHROCYTE [DISTWIDTH] IN BLOOD BY AUTOMATED COUNT: 14.9 % (ref 11.5–14.5)
EST. GFR  (NO RACE VARIABLE): 37 ML/MIN/1.73 M^2
GLUCOSE SERPL-MCNC: 61 MG/DL (ref 70–110)
GLUCOSE UR QL STRIP: NEGATIVE
HCT VFR BLD AUTO: 45.4 % (ref 37–48.5)
HGB BLD-MCNC: 14.7 G/DL (ref 12–16)
HGB UR QL STRIP: ABNORMAL
IMM GRANULOCYTES # BLD AUTO: 0.09 K/UL (ref 0–0.04)
IMM GRANULOCYTES NFR BLD AUTO: 0.6 % (ref 0–0.5)
KETONES UR QL STRIP: ABNORMAL
LEUKOCYTE ESTERASE UR QL STRIP: NEGATIVE
LIPASE SERPL-CCNC: 27 U/L (ref 4–60)
LYMPHOCYTES # BLD AUTO: 1.1 K/UL (ref 1–4.8)
LYMPHOCYTES NFR BLD: 6.7 % (ref 18–48)
MCH RBC QN AUTO: 29.5 PG (ref 27–31)
MCHC RBC AUTO-ENTMCNC: 32.4 G/DL (ref 32–36)
MCV RBC AUTO: 91 FL (ref 82–98)
METHADONE UR QL SCN>300 NG/ML: NEGATIVE
MONOCYTES # BLD AUTO: 0.4 K/UL (ref 0.3–1)
MONOCYTES NFR BLD: 2.7 % (ref 4–15)
NEUTROPHILS # BLD AUTO: 14 K/UL (ref 1.8–7.7)
NEUTROPHILS NFR BLD: 89.7 % (ref 38–73)
NITRITE UR QL STRIP: NEGATIVE
NRBC BLD-RTO: 0 /100 WBC
OPIATES UR QL SCN: NEGATIVE
PCP UR QL SCN>25 NG/ML: NEGATIVE
PH UR STRIP: 6 [PH] (ref 5–8)
PLATELET # BLD AUTO: 175 K/UL (ref 150–450)
PMV BLD AUTO: 11.3 FL (ref 9.2–12.9)
POC MOLECULAR INFLUENZA A AGN: NEGATIVE
POC MOLECULAR INFLUENZA B AGN: NEGATIVE
POCT GLUCOSE: 71 MG/DL (ref 70–110)
POCT GLUCOSE: 97 MG/DL (ref 70–110)
POTASSIUM SERPL-SCNC: 3.7 MMOL/L (ref 3.5–5.1)
PROT SERPL-MCNC: 8.5 G/DL (ref 6–8.4)
PROT UR QL STRIP: ABNORMAL
RBC # BLD AUTO: 4.99 M/UL (ref 4–5.4)
SARS-COV-2 RDRP RESP QL NAA+PROBE: NEGATIVE
SODIUM SERPL-SCNC: 139 MMOL/L (ref 136–145)
SP GR UR STRIP: 1.02 (ref 1–1.03)
TOXICOLOGY INFORMATION: ABNORMAL
URN SPEC COLLECT METH UR: ABNORMAL
UROBILINOGEN UR STRIP-ACNC: NEGATIVE EU/DL
WBC # BLD AUTO: 15.65 K/UL (ref 3.9–12.7)

## 2022-11-09 PROCEDURE — 80053 COMPREHEN METABOLIC PANEL: CPT | Performed by: STUDENT IN AN ORGANIZED HEALTH CARE EDUCATION/TRAINING PROGRAM

## 2022-11-09 PROCEDURE — 25000003 PHARM REV CODE 250: Performed by: STUDENT IN AN ORGANIZED HEALTH CARE EDUCATION/TRAINING PROGRAM

## 2022-11-09 PROCEDURE — 63600175 PHARM REV CODE 636 W HCPCS: Performed by: STUDENT IN AN ORGANIZED HEALTH CARE EDUCATION/TRAINING PROGRAM

## 2022-11-09 PROCEDURE — 80307 DRUG TEST PRSMV CHEM ANLYZR: CPT | Performed by: STUDENT IN AN ORGANIZED HEALTH CARE EDUCATION/TRAINING PROGRAM

## 2022-11-09 PROCEDURE — 96361 HYDRATE IV INFUSION ADD-ON: CPT

## 2022-11-09 PROCEDURE — 99284 EMERGENCY DEPT VISIT MOD MDM: CPT | Mod: 25

## 2022-11-09 PROCEDURE — 96375 TX/PRO/DX INJ NEW DRUG ADDON: CPT

## 2022-11-09 PROCEDURE — 63600175 PHARM REV CODE 636 W HCPCS: Performed by: EMERGENCY MEDICINE

## 2022-11-09 PROCEDURE — 83690 ASSAY OF LIPASE: CPT | Performed by: STUDENT IN AN ORGANIZED HEALTH CARE EDUCATION/TRAINING PROGRAM

## 2022-11-09 PROCEDURE — 82962 GLUCOSE BLOOD TEST: CPT

## 2022-11-09 PROCEDURE — 81003 URINALYSIS AUTO W/O SCOPE: CPT | Mod: 59 | Performed by: STUDENT IN AN ORGANIZED HEALTH CARE EDUCATION/TRAINING PROGRAM

## 2022-11-09 PROCEDURE — 87502 INFLUENZA DNA AMP PROBE: CPT

## 2022-11-09 PROCEDURE — 96374 THER/PROPH/DIAG INJ IV PUSH: CPT

## 2022-11-09 PROCEDURE — 87635 SARS-COV-2 COVID-19 AMP PRB: CPT | Performed by: STUDENT IN AN ORGANIZED HEALTH CARE EDUCATION/TRAINING PROGRAM

## 2022-11-09 PROCEDURE — 85025 COMPLETE CBC W/AUTO DIFF WBC: CPT | Performed by: STUDENT IN AN ORGANIZED HEALTH CARE EDUCATION/TRAINING PROGRAM

## 2022-11-09 RX ORDER — ONDANSETRON 4 MG/1
4 TABLET, ORALLY DISINTEGRATING ORAL EVERY 8 HOURS PRN
Qty: 20 TABLET | Refills: 0 | Status: SHIPPED | OUTPATIENT
Start: 2022-11-09

## 2022-11-09 RX ORDER — DEXTROSE, SODIUM CHLORIDE, SODIUM LACTATE, POTASSIUM CHLORIDE, AND CALCIUM CHLORIDE 5; .6; .31; .03; .02 G/100ML; G/100ML; G/100ML; G/100ML; G/100ML
INJECTION, SOLUTION INTRAVENOUS
Status: COMPLETED | OUTPATIENT
Start: 2022-11-09 | End: 2022-11-09

## 2022-11-09 RX ORDER — DIPHENHYDRAMINE HYDROCHLORIDE 50 MG/ML
50 INJECTION INTRAMUSCULAR; INTRAVENOUS
Status: COMPLETED | OUTPATIENT
Start: 2022-11-09 | End: 2022-11-09

## 2022-11-09 RX ORDER — METOCLOPRAMIDE HYDROCHLORIDE 5 MG/ML
5 INJECTION INTRAMUSCULAR; INTRAVENOUS
Status: COMPLETED | OUTPATIENT
Start: 2022-11-09 | End: 2022-11-09

## 2022-11-09 RX ORDER — DROPERIDOL 2.5 MG/ML
1.25 INJECTION, SOLUTION INTRAMUSCULAR; INTRAVENOUS ONCE
Status: COMPLETED | OUTPATIENT
Start: 2022-11-09 | End: 2022-11-09

## 2022-11-09 RX ORDER — PROMETHAZINE HYDROCHLORIDE 25 MG/1
25 SUPPOSITORY RECTAL EVERY 6 HOURS PRN
Qty: 10 SUPPOSITORY | Refills: 0 | Status: SHIPPED | OUTPATIENT
Start: 2022-11-09

## 2022-11-09 RX ADMIN — SODIUM CHLORIDE 1000 ML: 0.9 INJECTION, SOLUTION INTRAVENOUS at 04:11

## 2022-11-09 RX ADMIN — DIPHENHYDRAMINE HYDROCHLORIDE 50 MG: 50 INJECTION, SOLUTION INTRAMUSCULAR; INTRAVENOUS at 05:11

## 2022-11-09 RX ADMIN — METOCLOPRAMIDE 5 MG: 5 INJECTION, SOLUTION INTRAMUSCULAR; INTRAVENOUS at 08:11

## 2022-11-09 RX ADMIN — DEXTROSE, SODIUM CHLORIDE, SODIUM LACTATE, POTASSIUM CHLORIDE, AND CALCIUM CHLORIDE: 5; .6; .31; .03; .02 INJECTION, SOLUTION INTRAVENOUS at 08:11

## 2022-11-09 RX ADMIN — DROPERIDOL 1.25 MG: 2.5 INJECTION, SOLUTION INTRAMUSCULAR; INTRAVENOUS at 05:11

## 2022-11-09 NOTE — ED PROVIDER NOTES
"Encounter Date: 2022    SCRIBE #1 NOTE: I, China Barros, am scribing for, and in the presence of,  Jason Burkett MD. I have scribed the following portions of the note - Other sections scribed: HPI, ROS, PE.     History     Chief Complaint   Patient presents with    Vomiting     Pt arrived via ems, pt chief complaint is Vomiting. Pt states has been having URI symptoms for about a week but today began having N/V/D. Pt also states feels weak, and per ems pt had a bp of 80P pt is bp is 143/66 in triage.      48 yo F with PMHx of grave's disease, discoid lupus, presents to the ED BIB EMS with vomiting and diarrhea. She reports intractable nausea, vomiting, and diarrhea onset 3h ago. She also reports associated upper abdominal pain. She currently rates her pain 5/10 in severity. She further notes chills. She received Zofran en route to to ED via EMS. No other exacerbating or alleviating factors. No recent sick contacts. Denies chest pain, shortness of breath, syncope, cough, dysuria, frequency, hematuria, melena, hematochezia, hematemesis, fever, or other associated symptoms.     The history is provided by the patient and the EMS personnel.   Review of patient's allergies indicates:   Allergen Reactions    Morphine Other (See Comments)     "throat closes up"     Past Medical History:   Diagnosis Date    Anxiety     Depression     Discoid lupus     Graves disease     Hypothyroid     Neuropathy due to systemic lupus erythematosus     Systemic lupus erythematosus arthritis      Past Surgical History:   Procedure Laterality Date     SECTION      THYROIDECTOMY  2004    TUBAL LIGATION       Family History   Problem Relation Age of Onset    No Known Problems Mother     No Known Problems Father      Social History     Tobacco Use    Smoking status: Former     Packs/day: 0.50     Years: 0.50     Pack years: 0.25     Types: Cigarettes   Substance Use Topics    Alcohol use: No    Drug use: No     Review of " Systems   Constitutional:  Positive for chills. Negative for fever.   HENT:  Negative for facial swelling and sore throat.    Eyes:  Negative for visual disturbance.   Respiratory:  Negative for cough and shortness of breath.    Cardiovascular:  Negative for chest pain and palpitations.   Gastrointestinal:  Positive for abdominal pain (upper), diarrhea, nausea and vomiting. Negative for blood in stool.   Genitourinary:  Negative for dysuria and hematuria.   Musculoskeletal:  Negative for back pain.   Skin:  Negative for rash.   Neurological:  Negative for weakness and headaches.   Hematological:  Does not bruise/bleed easily.   Psychiatric/Behavioral: Negative.       Physical Exam     Initial Vitals [11/09/22 0356]   BP Pulse Resp Temp SpO2   (!) 143/66 60 18 98.1 °F (36.7 °C) 98 %      MAP       --         Physical Exam    Nursing note and vitals reviewed.  Constitutional: She appears well-developed and well-nourished. She is not diaphoretic. No distress.   HENT:   Head: Normocephalic and atraumatic.   Right Ear: External ear normal.   Left Ear: External ear normal.   Nose: Nose normal.   Eyes: Conjunctivae are normal. No scleral icterus.   Neck: Neck supple. No tracheal deviation present.   Normal range of motion.  Cardiovascular:  Normal rate, regular rhythm and normal heart sounds.           Pulmonary/Chest: Breath sounds normal. No respiratory distress.   Abdominal: Abdomen is soft. Bowel sounds are normal. There is abdominal tenderness.   Soft abdomen, with tenderness to LUQ.   Musculoskeletal:      Cervical back: Normal range of motion and neck supple.     Neurological: She is alert and oriented to person, place, and time.   Skin: Skin is warm and dry.   Psychiatric: She has a normal mood and affect. Thought content normal.       ED Course   Procedures  Labs Reviewed   CBC W/ AUTO DIFFERENTIAL - Abnormal; Notable for the following components:       Result Value    WBC 15.65 (*)     RDW 14.9 (*)     Immature  Granulocytes 0.6 (*)     Gran # (ANC) 14.0 (*)     Immature Grans (Abs) 0.09 (*)     Gran % 89.7 (*)     Lymph % 6.7 (*)     Mono % 2.7 (*)     All other components within normal limits    Narrative:     Recoll. [id number] by LAR at 11/09/2022 06:01, reason: POSSIBLE   CONTAMINATION 11/09/2022  06:01   COMPREHENSIVE METABOLIC PANEL - Abnormal; Notable for the following components:    CO2 19 (*)     Glucose 61 (*)     Creatinine 1.7 (*)     Total Protein 8.5 (*)     ALT 7 (*)     Anion Gap 19 (*)     eGFR 37 (*)     All other components within normal limits    Narrative:     Recoll. [id number] by LAR at 11/09/2022 06:01, reason: POSSIBLE   CONTAMINATION 11/09/2022  06:01   URINALYSIS, REFLEX TO URINE CULTURE - Abnormal; Notable for the following components:    Protein, UA Trace (*)     Ketones, UA 1+ (*)     Occult Blood UA Trace (*)     All other components within normal limits    Narrative:     Specimen Source->Urine   DRUG SCREEN PANEL, URINE EMERGENCY - Abnormal; Notable for the following components:    Benzodiazepines Presumptive Positive (*)     All other components within normal limits    Narrative:     Specimen Source->Urine   LIPASE    Narrative:     Recoll. [id number] by LAR at 11/09/2022 06:01, reason: POSSIBLE   CONTAMINATION 11/09/2022  06:01   POCT INFLUENZA A/B MOLECULAR   SARS-COV-2 RDRP GENE   POCT GLUCOSE   POCT GLUCOSE          Imaging Results              X-Ray Abdomen Flat And Erect (Final result)  Result time 11/09/22 05:20:21      Final result by Nasra Sigala MD (11/09/22 05:20:21)                   Impression:      Please see above.      Electronically signed by: Nasra Sigala MD  Date:    11/09/2022  Time:    05:20               Narrative:    EXAMINATION:  XR ABDOMEN FLAT AND ERECT    CLINICAL HISTORY:  Unspecified abdominal pain    TECHNIQUE:  Flat and erect AP views of the abdomen were performed.    COMPARISON:  None    FINDINGS:  There is gaseous distention of the stomach on supine  radiograph.  Scattered air is seen throughout nondilated loops of large and small bowel.  No small bowel air-fluid levels or dilated small bowel loops are appreciated on the upright radiograph.  No definite evidence of free intraperitoneal air.The visualized lung bases appear grossly clear.Visualized osseous structures appear intact noting degenerative change of the lower lumbar spine..                                       Medications   diphenhydrAMINE injection 50 mg (50 mg Intravenous Given 11/9/22 0507)   droperidoL injection 1.25 mg (1.25 mg Intravenous Given 11/9/22 0506)   sodium chloride 0.9% bolus 1,000 mL (0 mLs Intravenous Stopped 11/9/22 0833)   dextrose 5 % in lactated ringers infusion (0 mL/hr Intravenous Stopped 11/9/22 1403)   metoclopramide HCl injection 5 mg (5 mg Intravenous Given 11/9/22 0833)     Medical Decision Making:   History:   Old Medical Records: I decided to obtain old medical records.  Clinical Tests:   Lab Tests: Ordered and Reviewed  Radiological Study: Ordered and Reviewed        Scribe Attestation:   Scribe #1: I performed the above scribed service and the documentation accurately describes the services I performed. I attest to the accuracy of the note.      ED Course as of 11/09/22 1838   Wed Nov 09, 2022   3319 FINDINGS:  There is gaseous distention of the stomach on supine radiograph.  Scattered air is seen throughout nondilated loops of large and small bowel.  No small bowel air-fluid levels or dilated small bowel loops are appreciated on the upright radiograph.  No definite evidence of free intraperitoneal air.The visualized lung bases appear grossly clear.Visualized osseous structures appear intact noting degenerative change of the lower lumbar spine..     Impression:     Please see above.    [CC]   6588 Hand off to Dr. Qureshi, follow up labs, re-evaluate. [CC]      ED Course User Index  [CC] Jason Burkett MD            Assumed care patient from Dr. Burkett at  turnover, lab work showing slight leukocytosis 15.65, glucose 61, creatinine 1.7, bicarb 19, anion gap 19, lipase 27, COVID negative, flu negative.  Patient presentation consistent with acute on chronic abdominal pain, nausea and vomiting.  Patient given bolus of D5 LR with considerable improvement in symptoms abdominal exam remains benign, nontender in unremarkable, able to tolerate oral fluids and food upon reassessment.  Will continue to treat symptomatically going full ordered this point.  Do not suspect any acute surgical medical emergency at this point.  Discussed diagnosis and further treatment with patient, including f/u with PCP in the next week.  Return precautions given and all questions answered.  Patient in understanding of plan.  Pt discharged to home improved and stable.             Clinical Impression:   Final diagnoses:  [R10.9] Abdominal pain  [R11.2] Nausea and vomiting, unspecified vomiting type (Primary)      ED Disposition Condition    Discharge Stable        I, Jason Burkett MD, personally performed the services described in this documentation. All medical record entries made by the scribe were at my direction and in my presence. I have reviewed the chart and agree that the record reflects my personal performance and is accurate and complete.    ED Prescriptions       Medication Sig Dispense Start Date End Date Auth. Provider    ondansetron (ZOFRAN-ODT) 4 MG TbDL Take 1 tablet (4 mg total) by mouth every 8 (eight) hours as needed. 20 tablet 11/9/2022 -- Tonio Qureshi MD    promethazine (PHENERGAN) 25 MG suppository Place 1 suppository (25 mg total) rectally every 6 (six) hours as needed for Nausea. 10 suppository 11/9/2022 -- Tonio Qureshi MD          Follow-up Information       Follow up With Specialties Details Why Contact Info    [facility] Emergency Dept Emergency Medicine Go to  If symptoms worsen [address] [place] [address] [phone]    Vu  [facility] Go in 1 week As needed [address] [facility] [address]  [phone]               Tonio Qureshi MD  11/09/22 9590

## 2022-11-09 NOTE — ED TRIAGE NOTES
Pt presents to ED via EMS c/o n/v/d x 3 hrs pta.  Associated symptom abd pain.  Denies cp, sob, cough, congestion, urinary symptoms or any other symptoms.  Hx of Graves's disease and lupus.  Pain 5/10.

## 2022-11-10 ENCOUNTER — HOSPITAL ENCOUNTER (EMERGENCY)
Facility: HOSPITAL | Age: 47
Discharge: HOME | End: 2022-11-10
Attending: EMERGENCY MEDICINE
Payer: MEDICAID

## 2022-11-10 VITALS
HEIGHT: 66 IN | DIASTOLIC BLOOD PRESSURE: 72 MMHG | HEART RATE: 52 BPM | WEIGHT: 250 LBS | SYSTOLIC BLOOD PRESSURE: 145 MMHG | OXYGEN SATURATION: 96 % | TEMPERATURE: 99 F | RESPIRATION RATE: 19 BRPM | BODY MASS INDEX: 40.18 KG/M2

## 2022-11-10 DIAGNOSIS — T50.901A OVERDOSE: ICD-10-CM

## 2022-11-10 DIAGNOSIS — T50.901A ACCIDENTAL DRUG OVERDOSE, INITIAL ENCOUNTER: Primary | ICD-10-CM

## 2022-11-10 LAB
ALBUMIN SERPL BCP-MCNC: 3.2 G/DL (ref 3.5–5.2)
ALP SERPL-CCNC: 67 U/L (ref 55–135)
ALT SERPL W/O P-5'-P-CCNC: <5 U/L (ref 10–44)
ANION GAP SERPL CALC-SCNC: 10 MMOL/L (ref 8–16)
AST SERPL-CCNC: 26 U/L (ref 10–40)
BASOPHILS # BLD AUTO: 0.04 K/UL (ref 0–0.2)
BASOPHILS NFR BLD: 0.3 % (ref 0–1.9)
BILIRUB SERPL-MCNC: 1 MG/DL (ref 0.1–1)
BUN SERPL-MCNC: 14 MG/DL (ref 6–20)
CALCIUM SERPL-MCNC: 7.7 MG/DL (ref 8.7–10.5)
CHLORIDE SERPL-SCNC: 109 MMOL/L (ref 95–110)
CO2 SERPL-SCNC: 20 MMOL/L (ref 23–29)
CREAT SERPL-MCNC: 1.5 MG/DL (ref 0.5–1.4)
DIFFERENTIAL METHOD: ABNORMAL
EOSINOPHIL # BLD AUTO: 0 K/UL (ref 0–0.5)
EOSINOPHIL NFR BLD: 0.1 % (ref 0–8)
ERYTHROCYTE [DISTWIDTH] IN BLOOD BY AUTOMATED COUNT: 14.6 % (ref 11.5–14.5)
EST. GFR  (NO RACE VARIABLE): 42 ML/MIN/1.73 M^2
GLUCOSE SERPL-MCNC: 88 MG/DL (ref 70–110)
HCT VFR BLD AUTO: 46 % (ref 37–48.5)
HGB BLD-MCNC: 15 G/DL (ref 12–16)
IMM GRANULOCYTES # BLD AUTO: 0.06 K/UL (ref 0–0.04)
IMM GRANULOCYTES NFR BLD AUTO: 0.5 % (ref 0–0.5)
LYMPHOCYTES # BLD AUTO: 1.6 K/UL (ref 1–4.8)
LYMPHOCYTES NFR BLD: 11.9 % (ref 18–48)
MCH RBC QN AUTO: 28.6 PG (ref 27–31)
MCHC RBC AUTO-ENTMCNC: 32.6 G/DL (ref 32–36)
MCV RBC AUTO: 88 FL (ref 82–98)
MONOCYTES # BLD AUTO: 0.5 K/UL (ref 0.3–1)
MONOCYTES NFR BLD: 3.5 % (ref 4–15)
NEUTROPHILS # BLD AUTO: 11 K/UL (ref 1.8–7.7)
NEUTROPHILS NFR BLD: 83.7 % (ref 38–73)
NRBC BLD-RTO: 0 /100 WBC
PLATELET # BLD AUTO: 229 K/UL (ref 150–450)
PMV BLD AUTO: 11.6 FL (ref 9.2–12.9)
POTASSIUM SERPL-SCNC: 3.4 MMOL/L (ref 3.5–5.1)
PROT SERPL-MCNC: 6.4 G/DL (ref 6–8.4)
RBC # BLD AUTO: 5.25 M/UL (ref 4–5.4)
SODIUM SERPL-SCNC: 139 MMOL/L (ref 136–145)
WBC # BLD AUTO: 13.11 K/UL (ref 3.9–12.7)

## 2022-11-10 PROCEDURE — 96360 HYDRATION IV INFUSION INIT: CPT

## 2022-11-10 PROCEDURE — 80053 COMPREHEN METABOLIC PANEL: CPT | Performed by: EMERGENCY MEDICINE

## 2022-11-10 PROCEDURE — 99284 EMERGENCY DEPT VISIT MOD MDM: CPT | Mod: 25

## 2022-11-10 PROCEDURE — 93010 ELECTROCARDIOGRAM REPORT: CPT | Mod: ,,, | Performed by: INTERNAL MEDICINE

## 2022-11-10 PROCEDURE — 25000003 PHARM REV CODE 250: Performed by: EMERGENCY MEDICINE

## 2022-11-10 PROCEDURE — 93005 ELECTROCARDIOGRAM TRACING: CPT

## 2022-11-10 PROCEDURE — 85025 COMPLETE CBC W/AUTO DIFF WBC: CPT | Performed by: EMERGENCY MEDICINE

## 2022-11-10 PROCEDURE — 93010 EKG 12-LEAD: ICD-10-PCS | Mod: ,,, | Performed by: INTERNAL MEDICINE

## 2022-11-10 RX ADMIN — SODIUM CHLORIDE 1000 ML: 0.9 INJECTION, SOLUTION INTRAVENOUS at 11:11

## 2022-11-10 NOTE — DISCHARGE INSTRUCTIONS
Please do not take medications not prescribed to you and only take them in the doses prescribed.  Return if you get worse or new symptoms develop.

## 2022-11-10 NOTE — ED PROVIDER NOTES
"Encounter Date: 11/10/2022    SCRIBE #1 NOTE: I, Piedad Garcia, am scribing for, and in the presence of,  Felipe Mayorga Jr., MD. I have scribed the following portions of the note - Other sections scribed: HPI, ROS.     History     Chief Complaint   Patient presents with    Drug Overdose     Friend called EMS after pt became unresponsive. Pt admits to taking 2 percocet, 1 xanax, 1 zanaflex, and 1 gabapentin. 1/2 narcan given IV with positive response      Beth Cassidy is a 47 y.o. female with a PMHx of hypothyroidism, neuropathy, Graves disease, discoid lupus, depression, anxiety, who presents to the ED c/o accidental drug overdose. Pt reports taking 2 Percocet, 1 Xanax, 1 Zanaflex, and 1 Gabapentin. Pt reports an ED visit yesterday for nausea, diarrhea, and abdominal pain. She was given Gabapentin, with no relief to her symptoms. Pt denies any pain currently. Pt still has nausea and diarrhea. Pt describes her stools as watery and brown. Pt has the associated symptom of chills. No other exacerbating or alleviating factors. Denies any vomiting, hematochezia, black or bloody stools, fever, cough, SOB, abdominal pain, chest pain, suicidal ideation, or other associated symptoms. Denies any alcohol use.      The history is provided by the patient.   Review of patient's allergies indicates:   Allergen Reactions    Morphine Other (See Comments)     "throat closes up"     Past Medical History:   Diagnosis Date    Anxiety     Depression     Discoid lupus     Graves disease     Hypothyroid     Neuropathy due to systemic lupus erythematosus     Systemic lupus erythematosus arthritis      Past Surgical History:   Procedure Laterality Date     SECTION      THYROIDECTOMY  2004    TUBAL LIGATION       Family History   Problem Relation Age of Onset    No Known Problems Mother     No Known Problems Father      Social History     Tobacco Use    Smoking status: Former     Packs/day: 0.50     Years: 0.50     Pack " years: 0.25     Types: Cigarettes   Substance Use Topics    Alcohol use: No    Drug use: No     Review of Systems   Constitutional:  Positive for chills. Negative for fever.   Respiratory:  Negative for cough and shortness of breath.    Cardiovascular:  Negative for chest pain.   Gastrointestinal:  Positive for diarrhea (watery, brown stools) and nausea. Negative for abdominal pain, anal bleeding, blood in stool and vomiting.   Psychiatric/Behavioral:  Negative for suicidal ideas.      Physical Exam     Initial Vitals [11/10/22 1052]   BP Pulse Resp Temp SpO2   (!) 99/58 68 20 98.5 °F (36.9 °C) (!) 93 %      MAP       --         Physical Exam    Nursing note and vitals reviewed.  Constitutional: She appears well-developed and well-nourished. She is not diaphoretic. No distress.   HENT:   Head: Normocephalic and atraumatic.   Right Ear: External ear normal.   Left Ear: External ear normal.   Nose: Nose normal.   Mouth/Throat: Oropharynx is clear and moist.   Eyes: Conjunctivae and EOM are normal. Pupils are equal, round, and reactive to light. Right eye exhibits no discharge. Left eye exhibits no discharge. No scleral icterus.   Neck: Neck supple.   Normal range of motion.  Cardiovascular:  Normal rate, regular rhythm, normal heart sounds and intact distal pulses.           No murmur heard.  Pulmonary/Chest: Breath sounds normal. No respiratory distress. She has no wheezes. She has no rhonchi. She has no rales.   Abdominal: Abdomen is soft. Bowel sounds are normal. She exhibits no distension and no mass. There is no abdominal tenderness. There is no rebound and no guarding.   Musculoskeletal:         General: No tenderness or edema. Normal range of motion.      Cervical back: Normal range of motion and neck supple.     Neurological: She is alert and oriented to person, place, and time. She has normal strength. No cranial nerve deficit or sensory deficit.   Patient's speech is slightly slurred.  Otherwise, no focal  neurologic deficits noted.   Skin: Skin is warm and dry. No rash noted. No erythema. No pallor.   Psychiatric: She has a normal mood and affect. Her behavior is normal. Judgment and thought content normal.       ED Course   Procedures  Labs Reviewed   CBC W/ AUTO DIFFERENTIAL - Abnormal; Notable for the following components:       Result Value    WBC 13.11 (*)     RDW 14.6 (*)     Gran # (ANC) 11.0 (*)     Immature Grans (Abs) 0.06 (*)     Gran % 83.7 (*)     Lymph % 11.9 (*)     Mono % 3.5 (*)     All other components within normal limits   COMPREHENSIVE METABOLIC PANEL - Abnormal; Notable for the following components:    Potassium 3.4 (*)     CO2 20 (*)     Creatinine 1.5 (*)     Calcium 7.7 (*)     Albumin 3.2 (*)     ALT <5 (*)     eGFR 42 (*)     All other components within normal limits     EKG Readings: (Independently Interpreted)   Initial Reading: No STEMI. Ectopy: No Ectopy.   EKG reviewed and interpreted by me shows sinus rhythm at a rate of 60 beats per minute.  There is LVH with repolarization abnormality.  QTC is prolonged at 474 milliseconds.  There is a left axis deviation.  There are repolarization abnormalities most prominently noted in the high lateral leads.       Imaging Results    None          Medications   sodium chloride 0.9% bolus 1,000 mL (0 mLs Intravenous Stopped 11/10/22 1230)     Medical Decision Making:   ED Management:  This is the emergent evaluation of a 47-year-old female who presents emergency department for evaluation of overdose.  Patient took 2 Percocet, 1 Zanaflex, 1 Xanax, and 1-2 gabapentin medications.  She denies any suicidal ideation or plans to harm herself.  States that she was treating her symptoms of nausea and abdominal pain.  She was seen yesterday and worked up for this.  She no longer has abdominal pain but still does not feel like eating.  She is not vomiting.  She states she has been having diarrhea.  Differential diagnosis at the time of initial evaluation  included, but was not limited to:   Polysubstance overdose, metabolic derangement, infection.  Patient's laboratory evaluation reveals some abnormalities which are improved from her evaluation yesterday.  Patient initially hypotensive but this improved.  EKG reassuring.  Patient denies self-harm intent.  Patient was able to ambulate and be safely discharged after observation in the emergency department.  Will be advised to return for any new or worsening symptoms and avoid medications not prescribed to her.        Scribe Attestation:   Scribe #1: I performed the above scribed service and the documentation accurately describes the services I performed. I attest to the accuracy of the note.                   Clinical Impression:   Final diagnoses:  [T50.901A] Overdose  [T50.901A] Accidental drug overdose, initial encounter (Primary)        ED Disposition Condition    Discharge Stable          ED Prescriptions    None       Follow-up Information       Follow up With Specialties Details Why Contact Info    Bud Paz MD Family Medicine Call in 3 days  [address] [facility] [address] [phone]            I, Felipe Mayorga MD, personally performed the services described in this documentation. All medical record entries made by the scribe were at my direction and in my presence. I have reviewed the chart and agree that the record reflects my personal performance and is accurate and complete.       Felipe Mayorga Jr., MD  11/10/22 3432

## 2022-11-10 NOTE — ED TRIAGE NOTES
Friend called EMS after pt became unresponsive. Pt admits to taking 2 percocet, 1 xanax, 1 zanaflex, and 1 gabapentin. 1/2 narcan given IV with positive response . Patient currently drowsy but easily aroused to verbal and painful stimuli. AAOx4. O2 sat >95% on RA when awake. Placed 2L NC for comfort. Patient denies SI or HI, reports accidental overdose.

## 2024-08-30 ENCOUNTER — HOSPITAL ENCOUNTER (EMERGENCY)
Facility: HOSPITAL | Age: 49
Discharge: HOME | End: 2024-08-30
Attending: EMERGENCY MEDICINE
Payer: MEDICAID

## 2024-08-30 VITALS
WEIGHT: 243 LBS | HEIGHT: 66 IN | BODY MASS INDEX: 39.05 KG/M2 | DIASTOLIC BLOOD PRESSURE: 80 MMHG | RESPIRATION RATE: 18 BRPM | SYSTOLIC BLOOD PRESSURE: 117 MMHG | OXYGEN SATURATION: 98 % | TEMPERATURE: 98 F | HEART RATE: 66 BPM

## 2024-08-30 DIAGNOSIS — J06.9 VIRAL URI WITH COUGH: Primary | ICD-10-CM

## 2024-08-30 DIAGNOSIS — R07.89 CHEST TIGHTNESS: ICD-10-CM

## 2024-08-30 DIAGNOSIS — R05.9 COUGH: ICD-10-CM

## 2024-08-30 DIAGNOSIS — R09.81 NASAL CONGESTION: ICD-10-CM

## 2024-08-30 LAB
CTP QC/QA: YES
INFLUENZA A ANTIGEN, POC: NEGATIVE
INFLUENZA B ANTIGEN, POC: NEGATIVE
OHS QRS DURATION: 106 MS
OHS QTC CALCULATION: 475 MS
SARS-COV-2 RDRP RESP QL NAA+PROBE: NEGATIVE

## 2024-08-30 PROCEDURE — 94640 AIRWAY INHALATION TREATMENT: CPT | Mod: ER

## 2024-08-30 PROCEDURE — 87635 SARS-COV-2 COVID-19 AMP PRB: CPT | Mod: ER | Performed by: EMERGENCY MEDICINE

## 2024-08-30 PROCEDURE — 99284 EMERGENCY DEPT VISIT MOD MDM: CPT | Mod: 25,ER

## 2024-08-30 PROCEDURE — 87804 INFLUENZA ASSAY W/OPTIC: CPT | Mod: 59,ER

## 2024-08-30 PROCEDURE — 25000242 PHARM REV CODE 250 ALT 637 W/ HCPCS: Mod: ER | Performed by: EMERGENCY MEDICINE

## 2024-08-30 PROCEDURE — 93005 ELECTROCARDIOGRAM TRACING: CPT | Mod: ER

## 2024-08-30 PROCEDURE — 93010 ELECTROCARDIOGRAM REPORT: CPT | Mod: ,,, | Performed by: INTERNAL MEDICINE

## 2024-08-30 RX ORDER — ALBUTEROL SULFATE 90 UG/1
1-2 INHALANT RESPIRATORY (INHALATION) EVERY 6 HOURS PRN
Qty: 18 G | Refills: 3 | Status: SHIPPED | OUTPATIENT
Start: 2024-08-30 | End: 2025-08-30

## 2024-08-30 RX ORDER — FLUTICASONE PROPIONATE 50 MCG
1-2 SPRAY, SUSPENSION (ML) NASAL DAILY
Qty: 1 G | Refills: 0 | Status: SHIPPED | OUTPATIENT
Start: 2024-08-30

## 2024-08-30 RX ORDER — GUAIFENESIN AND DEXTROMETHORPHAN HYDROBROMIDE 10; 100 MG/5ML; MG/5ML
5 SYRUP ORAL 4 TIMES DAILY PRN
Qty: 120 ML | Refills: 0 | Status: SHIPPED | OUTPATIENT
Start: 2024-08-30 | End: 2024-09-09

## 2024-08-30 RX ORDER — ALBUTEROL SULFATE 0.63 MG/3ML
0.63 SOLUTION RESPIRATORY (INHALATION) EVERY 6 HOURS PRN
Qty: 90 EACH | Refills: 5 | Status: SHIPPED | OUTPATIENT
Start: 2024-08-30

## 2024-08-30 RX ORDER — IPRATROPIUM BROMIDE AND ALBUTEROL SULFATE 2.5; .5 MG/3ML; MG/3ML
3 SOLUTION RESPIRATORY (INHALATION)
Status: COMPLETED | OUTPATIENT
Start: 2024-08-30 | End: 2024-08-30

## 2024-08-30 RX ORDER — CETIRIZINE HYDROCHLORIDE 10 MG/1
10 TABLET ORAL DAILY
Qty: 30 TABLET | Refills: 0 | Status: SHIPPED | OUTPATIENT
Start: 2024-08-30 | End: 2024-09-29

## 2024-08-30 RX ADMIN — IPRATROPIUM BROMIDE AND ALBUTEROL SULFATE 3 ML: 2.5; .5 SOLUTION RESPIRATORY (INHALATION) at 06:08

## 2024-08-30 NOTE — Clinical Note
"Beth Verdugomarlee Cassidy was seen and treated in our emergency department on 8/30/2024.  She may return to work on 09/03/2024.       If you have any questions or concerns, please don't hesitate to call.      Asiya Johnson MD"

## 2024-08-30 NOTE — ED PROVIDER NOTES
"Encounter Date: 2024       History     Chief Complaint   Patient presents with    Cough     Pt c/o cough & chest congestion starting yesterday, and diarrhea starting a few days ago     Patient is a 49-year-old woman presenting to the emergency department today for evaluation of a cough and congestion as well as loose stools.  She states the cough and congestion have been present for approximately 1 day.  She denies associated fever, chills, shortness of breath, or chest pain.  She states that 4 days ago she developed watery stool.  She states that she took an entire pack of Imodium and her symptoms improved.  However she states that despite no diarrhea yesterday she had recurrence of her loose stools today.  Patient is a smoker.  She states that she transitioned from smoking cigarettes to vaping.  She has a chronic history of muscle spasms that she states was seen on a previous MRI but does not complain of this today.  Of note, patient states she needs to take her child to school at 8:00 a.m. and this can not have a full workup at this time as it is currently 6:15 a.m..      Review of patient's allergies indicates:   Allergen Reactions    Morphine Other (See Comments)     "throat closes up"     Past Medical History:   Diagnosis Date    Anxiety     Depression     Discoid lupus     Graves disease     Hypothyroid     Neuropathy due to systemic lupus erythematosus     Systemic lupus erythematosus arthritis      Past Surgical History:   Procedure Laterality Date     SECTION      THYROIDECTOMY  2004    TUBAL LIGATION       Family History   Problem Relation Name Age of Onset    No Known Problems Mother      No Known Problems Father       Social History     Tobacco Use    Smoking status: Former     Current packs/day: 0.50     Average packs/day: 0.5 packs/day for 0.5 years (0.3 ttl pk-yrs)     Types: Cigarettes   Substance Use Topics    Alcohol use: No    Drug use: No     Review of Systems   Constitutional:  " Negative for chills and fever.   HENT:  Positive for congestion. Negative for sore throat.    Eyes:  Negative for pain and visual disturbance.   Respiratory:  Positive for cough. Negative for chest tightness, shortness of breath and wheezing.    Cardiovascular:  Negative for chest pain and leg swelling.   Gastrointestinal:  Negative for abdominal pain.   Genitourinary:  Negative for flank pain.   Musculoskeletal:  Negative for myalgias.   Skin:  Negative for color change.   Neurological:  Negative for weakness and headaches.   Hematological:  Does not bruise/bleed easily.   Psychiatric/Behavioral:  Negative for suicidal ideas.    All other systems reviewed and are negative.      Physical Exam     Initial Vitals [08/30/24 0524]   BP Pulse Resp Temp SpO2   117/80 62 20 97.8 °F (36.6 °C) 97 %      MAP       --         Physical Exam    Nursing note and vitals reviewed.  Constitutional: She appears well-developed and well-nourished. No distress.   HENT:   Head: Normocephalic and atraumatic.   Mouth/Throat: Oropharynx is clear and moist.   Eyes: Conjunctivae and EOM are normal. Pupils are equal, round, and reactive to light. Right eye exhibits no discharge. Left eye exhibits no discharge.   Neck: Neck supple.   Normal range of motion.  Cardiovascular:  Regular rhythm.   Bradycardia present.   Exam reveals no decreased pulses.       No murmur heard.  Pulmonary/Chest: No respiratory distress. She has wheezes in the right lower field and the left lower field. She has no rhonchi. She has no rales.   Abdominal: Abdomen is soft. She exhibits no distension.   Musculoskeletal:         General: No tenderness. Normal range of motion.      Cervical back: Normal range of motion and neck supple.     Neurological: She is alert.   Skin: Skin is warm and dry.   Psychiatric: She has a normal mood and affect.         ED Course   Procedures  Labs Reviewed   SARS-COV-2 RDRP GENE       Result Value    POC Rapid COVID Negative      Quality  "Control Acceptable Yes      Narrative:     This test utilizes isothermal nucleic acid amplification technology to detect the SARS-CoV-2 RdRp nucleic acid segment. The analytical sensitivity (limit of detection) is 500 copies/swab.     A POSITIVE result is indicative of the presence of SARS-CoV-2 RNA; clinical correlation with patient history and other diagnostic information is necessary to determine patient infection status.    A NEGATIVE result means that SARS-CoV-2 nucleic acids are not present above the limit of detection. A NEGATIVE result should be treated as presumptive. It does not rule out the possibility of COVID-19 and should not be the sole basis for treatment decisions. If COVID-19 is strongly suspected based on clinical and exposure history, re-testing using an alternate molecular assay should be considered.     Commercial kits are provided by [employer].   _________________________________________________________________   The authorized Fact Sheet for Healthcare Providers and the authorized Fact Sheet for Patients of the ID NOW COVID-19 are available on the FDA website:    https://www.fda.gov/media/519421/download      https://www.fda.gov/media/815185/download      POCT INFLUENZA A/B MOLECULAR   POCT RAPID INFLUENZA A/B    Influenza B Ag negative      Inflenza A Ag negative       EKG Readings: (Independently Interpreted)   Initial Reading: No STEMI. Rhythm: Sinus Bradycardia.       Imaging Results              X-Ray Chest PA And Lateral (Final result)  Result time 08/30/24 06:33:08      Final result by Michael Alonzo MD (08/30/24 06:33:08)                   Impression:      No acute cardiopulmonary finding.      Electronically signed by: Michael Alonzo MD  Date:    08/30/2024  Time:    06:33               Narrative:    EXAMINATION:  XR CHEST PA AND LATERAL    CLINICAL HISTORY:  Provided history is "  Cough, unspecified".    TECHNIQUE:  Frontal and lateral views of the chest were " performed.    COMPARISON:  08/07/2017.    FINDINGS:  Cardiac silhouette is not enlarged. Coarse interstitial lung markings but no large focal area of consolidation.  No sizable pleural effusion.  No pneumothorax.                                       Medications   albuterol-ipratropium 2.5 mg-0.5 mg/3 mL nebulizer solution 3 mL (3 mLs Nebulization Given 8/30/24 0625)     Medical Decision Making  This is an emergent evaluation of a 49-year-old woman presenting to the emergency department today secondary to cough, congestion, and loose stools.  Differential diagnoses include viral syndrome, COVID-19, pneumonia, amongst others.  On physical examination, patient was in no acute distress.  Heart sounds were mildly bradycardic and lung sounds revealed scant wheezes in bilateral lower lung fields.  Abdomen was completely soft, nontender, nondistended without rebound or guarding.  EKG was obtained and showed a sinus bradycardia with T-wave inversions in 1, aVL, 2, V4, V5, and V6.  These were previously present on the 25th May 2021.  Patient is chest pain-free at this time, denies nausea or other anginal equivalents.  Chest x-ray and DuoNebs are also ordered and pending at this time.    Asiya Johnson MD  6:39 AM  8/30/2024    Patient's chest x-ray returned and was without acute processes per Radiology interpretation.  On my re-evaluation, patient's lung examination has improved greatly.  She has only mild expiratory wheezes in her lower lobes of her lung.  She reports she feels significantly better.  I will discharge patient home at this time.  I had a long discussion with both her and her  about the importance of quitting vaping.  We discussed follow up with primary care and return precautions including any development of chest pain, shortness of breath, abdominal pain, intractable nausea or vomiting, or any other concerning symptoms.  She voiced understanding of this plan.  I refilled her nebulizer medications for  home, her albuterol, provided her with cetirizine and fluticasone as well as guaifenesin/dextromethorphan for symptomatic care.  All questions and concerns were addressed and patient ambulated from the emergency department unassisted.    Asiya Johnson MD  6:58 AM  8/30/2024          Amount and/or Complexity of Data Reviewed  Radiology: ordered.    Risk  OTC drugs.  Prescription drug management.                                      Clinical Impression:  Final diagnoses:  [R07.89] Chest tightness  [R05.9] Cough  [J06.9] Viral URI with cough (Primary)          ED Disposition Condition    Discharge Stable          ED Prescriptions       Medication Sig Dispense Start Date End Date Auth. Provider    albuterol (ACCUNEB) 0.63 mg/3 mL Nebu Take 3 mLs (0.63 mg total) by nebulization every 6 (six) hours as needed. 90 each 8/30/2024 -- Asiya Johnson MD    fluticasone propionate (FLONASE) 50 mcg/actuation nasal spray 1-2 sprays ( mcg total) by Each Nostril route once daily. 1 g 8/30/2024 -- Asiya Johnson MD    albuterol (PROVENTIL/VENTOLIN HFA) 90 mcg/actuation inhaler Inhale 1-2 puffs into the lungs every 6 (six) hours as needed for Wheezing. Rescue 18 g 8/30/2024 8/30/2025 Asiya Johnson MD    dextromethorphan-guaiFENesin  mg/5 ml (ROBITUSSIN-DM)  mg/5 mL liquid Take 5 mLs by mouth 4 (four) times daily as needed (cough). 120 mL 8/30/2024 9/9/2024 Asiya Johnson MD    cetirizine (ZYRTEC) 10 MG tablet Take 1 tablet (10 mg total) by mouth once daily. 30 tablet 8/30/2024 9/29/2024 Asiya Johnson MD          Follow-up Information       Follow up With Specialties Details Why Contact Info    Bud Paz MD Family Medicine Schedule an appointment as soon as possible for a visit in 3 days  [address] [facility] [id number] [phone]               Asiya Johnson MD  08/30/24 0896

## 2024-11-27 ENCOUNTER — PATIENT MESSAGE (OUTPATIENT)
Dept: RESEARCH | Facility: HOSPITAL | Age: 49
End: 2024-11-27
Payer: MEDICAID